# Patient Record
Sex: FEMALE | Race: WHITE | NOT HISPANIC OR LATINO | Employment: OTHER | URBAN - METROPOLITAN AREA
[De-identification: names, ages, dates, MRNs, and addresses within clinical notes are randomized per-mention and may not be internally consistent; named-entity substitution may affect disease eponyms.]

---

## 2019-11-15 RX ORDER — EZETIMIBE 10 MG/1
TABLET ORAL
Qty: 90 TABLET | Refills: 0 | Status: SHIPPED | OUTPATIENT
Start: 2019-11-15 | End: 2020-01-27

## 2019-12-18 RX ORDER — LOSARTAN POTASSIUM 25 MG/1
25 TABLET ORAL DAILY
Qty: 90 TABLET | Refills: 0 | Status: SHIPPED | OUTPATIENT
Start: 2019-12-18 | End: 2019-12-19 | Stop reason: SDUPTHER

## 2019-12-19 RX ORDER — LOSARTAN POTASSIUM 25 MG/1
25 TABLET ORAL DAILY
Qty: 90 TABLET | Refills: 0 | Status: SHIPPED | OUTPATIENT
Start: 2019-12-19 | End: 2020-03-16

## 2020-01-27 RX ORDER — EZETIMIBE 10 MG/1
TABLET ORAL
Qty: 90 TABLET | Refills: 0 | Status: SHIPPED | OUTPATIENT
Start: 2020-01-27 | End: 2020-04-09

## 2020-03-16 RX ORDER — LOSARTAN POTASSIUM 25 MG/1
25 TABLET ORAL DAILY
Qty: 90 TABLET | Refills: 0 | Status: SHIPPED | OUTPATIENT
Start: 2020-03-16 | End: 2020-06-09

## 2020-04-09 RX ORDER — EZETIMIBE 10 MG/1
TABLET ORAL
Qty: 90 TABLET | Refills: 0 | Status: SHIPPED | OUTPATIENT
Start: 2020-04-09 | End: 2020-08-27 | Stop reason: SDUPTHER

## 2020-06-09 RX ORDER — LOSARTAN POTASSIUM 25 MG/1
25 TABLET ORAL DAILY
Qty: 90 TABLET | Refills: 0 | Status: SHIPPED | OUTPATIENT
Start: 2020-06-09 | End: 2020-09-18 | Stop reason: SDUPTHER

## 2020-08-26 PROBLEM — R53.83 FATIGUE: Status: ACTIVE | Noted: 2017-04-27

## 2020-08-26 PROBLEM — I63.9 CEREBROVASCULAR ACCIDENT (CVA) (HCC): Status: ACTIVE | Noted: 2020-08-26

## 2020-08-26 PROBLEM — E78.5 HYPERLIPIDEMIA: Status: ACTIVE | Noted: 2020-08-26

## 2020-08-26 PROBLEM — I10 HYPERTENSION: Status: ACTIVE | Noted: 2020-08-26

## 2020-08-26 PROBLEM — I77.819 AORTIC ECTASIA (HCC): Status: ACTIVE | Noted: 2017-04-27

## 2020-08-27 ENCOUNTER — TELEPHONE (OUTPATIENT)
Dept: CARDIOLOGY | Facility: CLINIC | Age: 70
End: 2020-08-27

## 2020-08-27 RX ORDER — EZETIMIBE 10 MG/1
10 TABLET ORAL DAILY
Qty: 90 TABLET | Refills: 1 | Status: SHIPPED | OUTPATIENT
Start: 2020-08-27 | End: 2021-01-28

## 2020-09-18 ENCOUNTER — LAB (OUTPATIENT)
Dept: CARDIOLOGY | Facility: CLINIC | Age: 70
End: 2020-09-18

## 2020-09-18 ENCOUNTER — OFFICE VISIT (OUTPATIENT)
Dept: CARDIOLOGY | Facility: CLINIC | Age: 70
End: 2020-09-18

## 2020-09-18 VITALS
OXYGEN SATURATION: 95 % | SYSTOLIC BLOOD PRESSURE: 105 MMHG | DIASTOLIC BLOOD PRESSURE: 68 MMHG | HEART RATE: 58 BPM | BODY MASS INDEX: 30.32 KG/M2 | RESPIRATION RATE: 18 BRPM | HEIGHT: 65 IN | WEIGHT: 182 LBS

## 2020-09-18 DIAGNOSIS — I77.819 AORTIC ECTASIA (HCC): Primary | ICD-10-CM

## 2020-09-18 DIAGNOSIS — E78.5 DYSLIPIDEMIA: Primary | ICD-10-CM

## 2020-09-18 DIAGNOSIS — I10 ESSENTIAL HYPERTENSION: ICD-10-CM

## 2020-09-18 LAB
CHOLEST SERPL-MCNC: 196 MG/DL (ref 0–200)
HDLC SERPL QL: 2.8
HDLC SERPL-MCNC: 70 MG/DL (ref 40–60)
LDLC SERPL CALC-MCNC: 102 MG/DL (ref 0–100)
TRIGL SERPL-MCNC: 118 MG/DL (ref 0–150)

## 2020-09-18 PROCEDURE — 80061 LIPID PANEL: CPT | Performed by: INTERNAL MEDICINE

## 2020-09-18 PROCEDURE — 93000 ELECTROCARDIOGRAM COMPLETE: CPT | Performed by: INTERNAL MEDICINE

## 2020-09-18 PROCEDURE — 36416 COLLJ CAPILLARY BLOOD SPEC: CPT | Performed by: INTERNAL MEDICINE

## 2020-09-18 PROCEDURE — 99214 OFFICE O/P EST MOD 30 MIN: CPT | Performed by: INTERNAL MEDICINE

## 2020-09-18 RX ORDER — LOSARTAN POTASSIUM 25 MG/1
25 TABLET ORAL DAILY
Qty: 90 TABLET | Refills: 3 | Status: SHIPPED | OUTPATIENT
Start: 2020-09-18 | End: 2021-09-03

## 2020-09-18 NOTE — PROGRESS NOTES
"Cardiology Office Visit      Encounter Date:  09/18/2020    Patient ID:   Tori Brantley is a 70 y.o. female.    Reason For Followup:  Hypertension  Hyperlipidemia  Aortic ectasia    Brief Clinical History:  Dear Nicholas Marion MD    I had the pleasure of seeing Tori Brantley today. As you are well aware, this is a 70 y.o. female with no established history of ischemic heart disease.  She does have a history of hypertension, hyperlipidemia, aortic ectasia, and TIA.  She presents today for follow-up on the above conditions.    Interval History:  She denies any chest pain pressure heaviness or tightness.  She denies any shortness of breath.  She denies any PND orthopnea.  She denies any syncope or near syncope. She is reporting some occasional palpitations. She otherwise reports feeling well from a cardiac perspective.    She had a POC lipid profile performed and we reviewed the results. Her values have overall improved.    Assessment & Plan    Impressions:  Hypertension  Hyperlipidemia  Aortic ectasia  History of TIA    Recommendations:  Continuation of her current cardiovascular regimen at the present time.  CTA chest  Routine surveillance laboratory testing  Follow-up in 1 years time sooner should there be difficulties.    Objective:    Vitals:  Vitals:    09/18/20 1330   BP: 105/68   BP Location: Left arm   Patient Position: Sitting   Cuff Size: Large Adult   Pulse: 58   Resp: 18   SpO2: 95%   Weight: 82.6 kg (182 lb)   Height: 165.1 cm (65\")       Physical Exam:    General: Alert, cooperative, no distress, appears stated age  Head:  Normocephalic, atraumatic, mucous membranes moist  Eyes:  Conjunctiva/corneas clear, EOM's intact     Neck:  Supple,  no bruit  Lungs: Clear to auscultation bilaterally, no wheezes rhonchi rales are noted  Chest wall: No tenderness  Heart::  Regular rate and rhythm, S1 and S2 normal, 1/6 holosystolic murmur.  No rub or gallop  Abdomen: Soft, non-tender, nondistended " bowel sounds active  Extremities: No cyanosis, clubbing, or edema  Pulses: 2+ and symmetric all extremities  Skin:  No rashes or lesions  Neuro/psych: A&O x3. CN II through XII are grossly intact with appropriate affect      Allergies:  Allergies   Allergen Reactions   • Ibuprofen Hives   • Terbinafine Itching       Medication Review:     Current Outpatient Medications:   •  aspirin (Aspir-Low) 81 MG EC tablet, ASPIR-LOW 81 MG TBEC, Disp: , Rfl:   •  ezetimibe (ZETIA) 10 MG tablet, Take 1 tablet by mouth Daily., Disp: 90 tablet, Rfl: 1  •  levocetirizine (Xyzal Allergy 24HR) 5 MG tablet, Daily., Disp: , Rfl:   •  losartan (COZAAR) 25 MG tablet, TAKE 1 TABLET BY MOUTH DAILY, Disp: 90 tablet, Rfl: 0  •  omeprazole OTC (PrilOSEC OTC) 20 MG EC tablet, PRILOSEC OTC 20 MG TBEC, Disp: , Rfl:   •  Evolocumab (Repatha SureClick) 140 MG/ML solution auto-injector, REPATHA SURECLICK 140 MG/ML SOAJ, Disp: , Rfl:   •  metaxalone (Skelaxin) 800 MG tablet, SKELAXIN 800 MG TABS, Disp: , Rfl:     Family History:  Family History   Problem Relation Age of Onset   • Heart disease Mother    • Heart failure Father        Past Medical History:  Past Medical History:   Diagnosis Date   • Hyperlipidemia    • Hypertension    • Stroke (CMS/HCC)        Past surgical History:  Past Surgical History:   Procedure Laterality Date   • CATARACT EXTRACTION, BILATERAL     • EYE SURGERY     • HYSTERECTOMY     • LAPAROSCOPIC CHOLECYSTECTOMY         Social History:  Social History     Socioeconomic History   • Marital status:      Spouse name: Not on file   • Number of children: Not on file   • Years of education: Not on file   • Highest education level: Not on file   Tobacco Use   • Smoking status: Never Smoker   • Smokeless tobacco: Never Used   Substance and Sexual Activity   • Alcohol use: Not Currently   • Drug use: Never       Review of Systems:  The following systems were reviewed as they relate to the cardiovascular system: Constitutional,  Eyes, ENT, Cardiovascular, Respiratory, Gastrointestinal, Integumentary, Neurological, Psychiatric, Hematologic, Endocrine, Musculoskeletal, and Genitourinary. The pertinent cardiovascular findings are reported above with all other cardiovascular points within those systems being negative.    Diagnostic Study Review:     Current Electrocardiogram:    ECG 12 Lead    Date/Time: 9/18/2020 5:34 PM  Performed by: Ward Hankins DO  Authorized by: Ward Hankins DO   Comparison: not compared with previous ECG   Previous ECG: no previous ECG available  Comments: Normal sinus rhythm with a ventricular rate of 65 bpm.  Low voltage in the precordial leads.  Normal QT and QTc intervals.  Normal QRS axis              NOTE: The following portions of the patient's history were reviewed and updated this visit as appropriate: allergies, current medications, past family history, past medical history, past social history, past surgical history and problem list.

## 2020-09-22 ENCOUNTER — LAB (OUTPATIENT)
Dept: LAB | Facility: HOSPITAL | Age: 70
End: 2020-09-22

## 2020-09-22 DIAGNOSIS — I77.819 AORTIC ECTASIA (HCC): ICD-10-CM

## 2020-09-22 DIAGNOSIS — I10 ESSENTIAL HYPERTENSION: ICD-10-CM

## 2020-09-22 LAB
ALBUMIN SERPL-MCNC: 4.3 G/DL (ref 3.5–5.2)
ALBUMIN/GLOB SERPL: 1.4 G/DL
ALP SERPL-CCNC: 46 U/L (ref 39–117)
ALT SERPL W P-5'-P-CCNC: 28 U/L (ref 1–33)
ANION GAP SERPL CALCULATED.3IONS-SCNC: 11.3 MMOL/L (ref 5–15)
AST SERPL-CCNC: 32 U/L (ref 1–32)
BASOPHILS # BLD AUTO: 0.08 10*3/MM3 (ref 0–0.2)
BASOPHILS NFR BLD AUTO: 1.1 % (ref 0–1.5)
BILIRUB SERPL-MCNC: 0.4 MG/DL (ref 0–1.2)
BUN SERPL-MCNC: 10 MG/DL (ref 8–23)
BUN/CREAT SERPL: 13.7 (ref 7–25)
CALCIUM SPEC-SCNC: 9.8 MG/DL (ref 8.6–10.5)
CHLORIDE SERPL-SCNC: 104 MMOL/L (ref 98–107)
CO2 SERPL-SCNC: 22.7 MMOL/L (ref 22–29)
CREAT SERPL-MCNC: 0.73 MG/DL (ref 0.57–1)
DEPRECATED RDW RBC AUTO: 41.2 FL (ref 37–54)
EOSINOPHIL # BLD AUTO: 0.3 10*3/MM3 (ref 0–0.4)
EOSINOPHIL NFR BLD AUTO: 4 % (ref 0.3–6.2)
ERYTHROCYTE [DISTWIDTH] IN BLOOD BY AUTOMATED COUNT: 12.4 % (ref 12.3–15.4)
GFR SERPL CREATININE-BSD FRML MDRD: 79 ML/MIN/1.73
GLOBULIN UR ELPH-MCNC: 3 GM/DL
GLUCOSE SERPL-MCNC: 86 MG/DL (ref 65–99)
HCT VFR BLD AUTO: 39.9 % (ref 34–46.6)
HGB BLD-MCNC: 13.5 G/DL (ref 12–15.9)
IMM GRANULOCYTES # BLD AUTO: 0.02 10*3/MM3 (ref 0–0.05)
IMM GRANULOCYTES NFR BLD AUTO: 0.3 % (ref 0–0.5)
LYMPHOCYTES # BLD AUTO: 2.82 10*3/MM3 (ref 0.7–3.1)
LYMPHOCYTES NFR BLD AUTO: 37.4 % (ref 19.6–45.3)
MCH RBC QN AUTO: 30.8 PG (ref 26.6–33)
MCHC RBC AUTO-ENTMCNC: 33.8 G/DL (ref 31.5–35.7)
MCV RBC AUTO: 91.1 FL (ref 79–97)
MONOCYTES # BLD AUTO: 0.53 10*3/MM3 (ref 0.1–0.9)
MONOCYTES NFR BLD AUTO: 7 % (ref 5–12)
NEUTROPHILS NFR BLD AUTO: 3.79 10*3/MM3 (ref 1.7–7)
NEUTROPHILS NFR BLD AUTO: 50.2 % (ref 42.7–76)
NRBC BLD AUTO-RTO: 0.1 /100 WBC (ref 0–0.2)
PLATELET # BLD AUTO: 278 10*3/MM3 (ref 140–450)
PMV BLD AUTO: 11.8 FL (ref 6–12)
POTASSIUM SERPL-SCNC: 4.4 MMOL/L (ref 3.5–5.2)
PROT SERPL-MCNC: 7.3 G/DL (ref 6–8.5)
RBC # BLD AUTO: 4.38 10*6/MM3 (ref 3.77–5.28)
SODIUM SERPL-SCNC: 138 MMOL/L (ref 136–145)
WBC # BLD AUTO: 7.54 10*3/MM3 (ref 3.4–10.8)

## 2020-09-22 PROCEDURE — 85025 COMPLETE CBC W/AUTO DIFF WBC: CPT

## 2020-09-22 PROCEDURE — 36415 COLL VENOUS BLD VENIPUNCTURE: CPT

## 2020-09-22 PROCEDURE — 80053 COMPREHEN METABOLIC PANEL: CPT

## 2020-10-28 ENCOUNTER — HOSPITAL ENCOUNTER (OUTPATIENT)
Dept: CT IMAGING | Facility: HOSPITAL | Age: 70
Discharge: HOME | End: 2020-10-28
Admitting: INTERNAL MEDICINE

## 2020-10-28 DIAGNOSIS — I77.819 AORTIC ECTASIA (HCC): ICD-10-CM

## 2020-10-28 DIAGNOSIS — I10 ESSENTIAL HYPERTENSION: ICD-10-CM

## 2020-10-28 LAB — CREAT BLDA-MCNC: 0.7 MG/DL (ref 0.6–1.3)

## 2020-10-28 PROCEDURE — 0 IOPAMIDOL PER 1 ML: Performed by: INTERNAL MEDICINE

## 2020-10-28 PROCEDURE — 71275 CT ANGIOGRAPHY CHEST: CPT

## 2020-10-28 PROCEDURE — 82565 ASSAY OF CREATININE: CPT

## 2020-10-28 RX ADMIN — IOPAMIDOL 100 ML: 755 INJECTION, SOLUTION INTRAVENOUS at 13:45

## 2020-11-05 ENCOUNTER — TELEPHONE (OUTPATIENT)
Dept: CARDIOLOGY | Facility: CLINIC | Age: 70
End: 2020-11-05

## 2020-11-05 NOTE — TELEPHONE ENCOUNTER
Called and Notified patient per Dr Hankins of CT results.  She verbalized understanding.      ______Message_____  Ward Hankins DO Trent, Melissa, MA             You can give her the results.  Her scan was normal with no signs of aneurysm or ectasia.        Previous Messages      ----- Message -----   From: Sandra Pike MA   Sent: 11/4/2020  12:58 PM EST   To: Ward Hankins DO   Subject: CT scan                                           Patient would like the results of her CT Angiogram Chest scan.   It is in the chart.

## 2021-01-28 RX ORDER — EZETIMIBE 10 MG/1
TABLET ORAL
Qty: 90 TABLET | Refills: 1 | Status: SHIPPED | OUTPATIENT
Start: 2021-01-28 | End: 2021-07-12

## 2021-07-12 RX ORDER — EZETIMIBE 10 MG/1
TABLET ORAL
Qty: 90 TABLET | Refills: 0 | Status: SHIPPED | OUTPATIENT
Start: 2021-07-12 | End: 2021-11-29

## 2021-09-03 RX ORDER — LOSARTAN POTASSIUM 25 MG/1
25 TABLET ORAL DAILY
Qty: 30 TABLET | Refills: 0 | Status: SHIPPED | OUTPATIENT
Start: 2021-09-03 | End: 2021-09-07

## 2021-09-07 RX ORDER — LOSARTAN POTASSIUM 25 MG/1
25 TABLET ORAL DAILY
Qty: 90 TABLET | Refills: 0 | Status: SHIPPED | OUTPATIENT
Start: 2021-09-07 | End: 2021-12-01

## 2021-11-29 RX ORDER — EZETIMIBE 10 MG/1
TABLET ORAL
Qty: 90 TABLET | Refills: 1 | Status: SHIPPED | OUTPATIENT
Start: 2021-11-29 | End: 2022-04-19

## 2021-12-01 DIAGNOSIS — I95.1 ORTHOSTATIC HYPOTENSION: Primary | ICD-10-CM

## 2021-12-01 RX ORDER — LOSARTAN POTASSIUM 25 MG/1
25 TABLET ORAL DAILY
Qty: 90 TABLET | Refills: 3 | Status: SHIPPED | OUTPATIENT
Start: 2021-12-01

## 2021-12-06 NOTE — PROGRESS NOTES
"Cardiology Office Visit      Encounter Date:  12/07/2021    Patient ID:   Tori Brantley is a 71 y.o. female.    Reason For Followup:  Hypertension  Hyperlipidemia  Aortic ectasia    Brief Clinical History:  Dear Nicholas Marion MD    I had the pleasure of seeing Tori Brantley today. As you are well aware, this is a 71 y.o. female with no established history of ischemic heart disease.  She does have a history of hypertension, hyperlipidemia, aortic ectasia, and TIA.  She presents today for follow-up on the above conditions.    Interval History:  She denies any chest pain pressure heaviness or tightness.  She denies any shortness of breath.  She denies any PND orthopnea.  She denies any syncope or near syncope. She is reporting some occasional palpitations. She otherwise reports feeling well from a cardiac perspective.    We did review her point-of-care lipid profile today.  Her results have deteriorated.  She reports that she has been sleeping in her diet.  She will work on this.    She had a CT angiography of her chest performed in October 2020.  She has a reported history of aortic ectasia however the CT scan failed to demonstrate any significant enlargement of the aorta.  In fact no evidence of ectasia or aneurysm was noted.  We reviewed these results today.    Assessment & Plan    Impressions:  Hypertension  Hyperlipidemia  History of aortic ectasia     CTA October 2020 with no evidence of ectasia or aneurysm  History of TIA    Recommendations:  Continuation of her current cardiovascular regimen at the present time.     This includes Zetia, antihypertensives, and antiplatelets.  Routine surveillance laboratory testing  Diet and exercise  Follow-up as needed    Objective:    Vitals:  Vitals:    12/07/21 0905   BP: 116/82   BP Location: Left arm   Patient Position: Sitting   Cuff Size: Large Adult   Pulse: 67   Resp: 18   SpO2: 97%   Weight: 81.2 kg (179 lb)   Height: 167.6 cm (66\")       Physical " Exam:    General: Alert, cooperative, no distress, appears stated age  Head:  Normocephalic, atraumatic, mucous membranes moist  Eyes:  Conjunctiva/corneas clear, EOM's intact     Neck:  Supple,  no bruit  Lungs: Clear to auscultation bilaterally, no wheezes rhonchi rales are noted  Chest wall: No tenderness  Heart::  Regular rate and rhythm, S1 and S2 normal, 1/6 holosystolic murmur.  No rub or gallop  Abdomen: Soft, non-tender, nondistended bowel sounds active  Extremities: No cyanosis, clubbing, or edema  Pulses: 2+ and symmetric all extremities  Skin:  No rashes or lesions  Neuro/psych: A&O x3. CN II through XII are grossly intact with appropriate affect      Allergies:  Allergies   Allergen Reactions   • Ibuprofen Hives   • Terbinafine Itching   • Latex Hives   • Nickel Dermatitis, Itching and Swelling       Medication Review:     Current Outpatient Medications:   •  albuterol (PROVENTIL) (2.5 MG/3ML) 0.083% nebulizer solution, MIX 1 VIAL IN NEBULIZER EVERY 6 HOURS AS NEEDED FOR WHEEZING, Disp: , Rfl:   •  albuterol sulfate  (90 Base) MCG/ACT inhaler, INHALE 1 PUFF INTO LUNGS FOUR TIMES DAILY AS NEEDED, Disp: , Rfl:   •  aspirin (Aspir-Low) 81 MG EC tablet, ASPIR-LOW 81 MG TBEC, Disp: , Rfl:   •  ezetimibe (ZETIA) 10 MG tablet, TAKE 1 TABLET DAILY, Disp: 90 tablet, Rfl: 1  •  levocetirizine (Xyzal Allergy 24HR) 5 MG tablet, Daily., Disp: , Rfl:   •  losartan (COZAAR) 25 MG tablet, TAKE 1 TABLET BY MOUTH DAILY, Disp: 90 tablet, Rfl: 3  •  meclizine (ANTIVERT) 25 MG tablet, Take 25 mg by mouth 3 (Three) Times a Day As Needed., Disp: , Rfl:   •  omeprazole OTC (PrilOSEC OTC) 20 MG EC tablet, PRILOSEC OTC 20 MG TBEC, Disp: , Rfl:     Family History:  Family History   Problem Relation Age of Onset   • Heart disease Mother    • Heart failure Father        Past Medical History:  Past Medical History:   Diagnosis Date   • Hyperlipidemia    • Hypertension    • Stroke (HCC)        Past surgical History:  Past  Surgical History:   Procedure Laterality Date   • CATARACT EXTRACTION, BILATERAL     • EYE SURGERY     • HYSTERECTOMY     • LAPAROSCOPIC CHOLECYSTECTOMY         Social History:  Social History     Socioeconomic History   • Marital status:    Tobacco Use   • Smoking status: Never Smoker   • Smokeless tobacco: Never Used   Vaping Use   • Vaping Use: Never used   Substance and Sexual Activity   • Alcohol use: Not Currently   • Drug use: Never       Review of Systems:  The following systems were reviewed as they relate to the cardiovascular system: Constitutional, Eyes, ENT, Cardiovascular, Respiratory, Gastrointestinal, Integumentary, Neurological, Psychiatric, Hematologic, Endocrine, Musculoskeletal, and Genitourinary. The pertinent cardiovascular findings are reported above with all other cardiovascular points within those systems being negative.    Diagnostic Study Review:     Current Electrocardiogram:    ECG 12 Lead    Date/Time: 12/7/2021 7:38 PM  Performed by: Ward Hankins DO  Authorized by: Ward Hankins DO   Comparison: not compared with previous ECG   Previous ECG: no previous ECG available  Comments: Normal sinus rhythm with a ventricular to 67 bpm.  Low voltage in the precordial leads.  Normal QT and QTc intervals.  Normal QRS axis.              NOTE: The following portions of the patient's note were reviewed, confirmed and/or updated this visit as appropriate: History of present illness/Interval history, physical examination, assessment & plan, allergies, current medications, past family history, past medical history, past social history, past surgical history and problem list.

## 2021-12-07 ENCOUNTER — OFFICE VISIT (OUTPATIENT)
Dept: CARDIOLOGY | Facility: CLINIC | Age: 71
End: 2021-12-07

## 2021-12-07 ENCOUNTER — LAB (OUTPATIENT)
Dept: CARDIOLOGY | Facility: CLINIC | Age: 71
End: 2021-12-07

## 2021-12-07 VITALS
SYSTOLIC BLOOD PRESSURE: 116 MMHG | WEIGHT: 179 LBS | OXYGEN SATURATION: 97 % | HEART RATE: 67 BPM | BODY MASS INDEX: 28.77 KG/M2 | HEIGHT: 66 IN | DIASTOLIC BLOOD PRESSURE: 82 MMHG | RESPIRATION RATE: 18 BRPM

## 2021-12-07 DIAGNOSIS — I77.819 AORTIC ECTASIA (HCC): Primary | ICD-10-CM

## 2021-12-07 DIAGNOSIS — E78.5 DYSLIPIDEMIA: Primary | ICD-10-CM

## 2021-12-07 DIAGNOSIS — E78.2 MIXED HYPERLIPIDEMIA: ICD-10-CM

## 2021-12-07 DIAGNOSIS — I10 PRIMARY HYPERTENSION: ICD-10-CM

## 2021-12-07 DIAGNOSIS — Z79.02 LONG TERM CURRENT USE OF ANTITHROMBOTICS/ANTIPLATELETS: ICD-10-CM

## 2021-12-07 LAB
CHOLEST SERPL-MCNC: 247 MG/DL (ref 0–200)
HDLC SERPL QL: 3.3
HDLC SERPL-MCNC: 75 MG/DL (ref 40–60)
LDLC SERPL CALC-MCNC: 124 MG/DL (ref 0–100)
TRIGL SERPL-MCNC: 239 MG/DL (ref 0–150)

## 2021-12-07 PROCEDURE — 80061 LIPID PANEL: CPT | Performed by: INTERNAL MEDICINE

## 2021-12-07 PROCEDURE — 93000 ELECTROCARDIOGRAM COMPLETE: CPT | Performed by: INTERNAL MEDICINE

## 2021-12-07 PROCEDURE — 99213 OFFICE O/P EST LOW 20 MIN: CPT | Performed by: INTERNAL MEDICINE

## 2021-12-07 RX ORDER — ALBUTEROL SULFATE 2.5 MG/3ML
SOLUTION RESPIRATORY (INHALATION)
COMMUNITY
Start: 2021-11-13

## 2021-12-07 RX ORDER — MECLIZINE HYDROCHLORIDE 25 MG/1
25 TABLET ORAL 3 TIMES DAILY PRN
COMMUNITY
Start: 2021-11-04

## 2021-12-07 RX ORDER — ALBUTEROL SULFATE 90 UG/1
AEROSOL, METERED RESPIRATORY (INHALATION)
COMMUNITY
Start: 2021-10-30

## 2022-04-19 RX ORDER — EZETIMIBE 10 MG/1
TABLET ORAL
Qty: 90 TABLET | Refills: 1 | Status: SHIPPED | OUTPATIENT
Start: 2022-04-19 | End: 2022-09-12

## 2022-09-12 RX ORDER — EZETIMIBE 10 MG/1
TABLET ORAL
Qty: 90 TABLET | Refills: 1 | Status: SHIPPED | OUTPATIENT
Start: 2022-09-12 | End: 2023-03-02

## 2023-03-02 RX ORDER — EZETIMIBE 10 MG/1
TABLET ORAL
Qty: 90 TABLET | Refills: 1 | Status: SHIPPED | OUTPATIENT
Start: 2023-03-02

## 2023-05-03 ENCOUNTER — OFFICE (AMBULATORY)
Dept: URBAN - METROPOLITAN AREA CLINIC 64 | Facility: CLINIC | Age: 73
End: 2023-05-03

## 2023-05-03 VITALS
WEIGHT: 188 LBS | SYSTOLIC BLOOD PRESSURE: 109 MMHG | DIASTOLIC BLOOD PRESSURE: 58 MMHG | HEIGHT: 65 IN | HEART RATE: 75 BPM

## 2023-05-03 DIAGNOSIS — R10.84 GENERALIZED ABDOMINAL PAIN: ICD-10-CM

## 2023-05-03 DIAGNOSIS — R11.0 NAUSEA: ICD-10-CM

## 2023-05-03 DIAGNOSIS — K21.9 GASTRO-ESOPHAGEAL REFLUX DISEASE WITHOUT ESOPHAGITIS: ICD-10-CM

## 2023-05-03 DIAGNOSIS — K59.00 CONSTIPATION, UNSPECIFIED: ICD-10-CM

## 2023-05-03 PROCEDURE — 99203 OFFICE O/P NEW LOW 30 MIN: CPT

## 2023-10-14 ENCOUNTER — HOSPITAL ENCOUNTER (OUTPATIENT)
Facility: HOSPITAL | Age: 73
Setting detail: OBSERVATION
Discharge: HOME | End: 2023-10-15
Attending: EMERGENCY MEDICINE | Admitting: EMERGENCY MEDICINE
Payer: MEDICARE

## 2023-10-14 ENCOUNTER — APPOINTMENT (OUTPATIENT)
Dept: GENERAL RADIOLOGY | Facility: HOSPITAL | Age: 73
End: 2023-10-14
Payer: MEDICARE

## 2023-10-14 DIAGNOSIS — R07.9 CHEST PAIN, UNSPECIFIED TYPE: Primary | ICD-10-CM

## 2023-10-14 DIAGNOSIS — M25.512 ACUTE PAIN OF LEFT SHOULDER: ICD-10-CM

## 2023-10-14 LAB
ALBUMIN SERPL-MCNC: 4.2 G/DL (ref 3.5–5.2)
ALBUMIN/GLOB SERPL: 1.3 G/DL
ALP SERPL-CCNC: 47 U/L (ref 39–117)
ALT SERPL W P-5'-P-CCNC: 18 U/L (ref 1–33)
ANION GAP SERPL CALCULATED.3IONS-SCNC: 13 MMOL/L (ref 5–15)
AST SERPL-CCNC: 26 U/L (ref 1–32)
BASOPHILS # BLD AUTO: 0.1 10*3/MM3 (ref 0–0.2)
BASOPHILS NFR BLD AUTO: 0.8 % (ref 0–1.5)
BILIRUB SERPL-MCNC: 0.5 MG/DL (ref 0–1.2)
BUN SERPL-MCNC: 14 MG/DL (ref 8–23)
BUN/CREAT SERPL: 17.1 (ref 7–25)
CALCIUM SPEC-SCNC: 10 MG/DL (ref 8.6–10.5)
CHLORIDE SERPL-SCNC: 102 MMOL/L (ref 98–107)
CHOLEST SERPL-MCNC: 214 MG/DL (ref 0–200)
CO2 SERPL-SCNC: 23 MMOL/L (ref 22–29)
CREAT SERPL-MCNC: 0.82 MG/DL (ref 0.57–1)
D DIMER PPP FEU-MCNC: 0.61 MG/L (FEU) (ref 0–0.73)
DEPRECATED RDW RBC AUTO: 47.3 FL (ref 37–54)
EGFRCR SERPLBLD CKD-EPI 2021: 75.6 ML/MIN/1.73
EOSINOPHIL # BLD AUTO: 0.5 10*3/MM3 (ref 0–0.4)
EOSINOPHIL NFR BLD AUTO: 5.5 % (ref 0.3–6.2)
ERYTHROCYTE [DISTWIDTH] IN BLOOD BY AUTOMATED COUNT: 13.9 % (ref 12.3–15.4)
GLOBULIN UR ELPH-MCNC: 3.3 GM/DL
GLUCOSE SERPL-MCNC: 93 MG/DL (ref 65–99)
HBA1C MFR BLD: 5.7 % (ref 4.8–5.6)
HCT VFR BLD AUTO: 41.3 % (ref 34–46.6)
HDLC SERPL-MCNC: 78 MG/DL (ref 40–60)
HGB BLD-MCNC: 13.6 G/DL (ref 12–15.9)
HOLD SPECIMEN: NORMAL
LDLC SERPL CALC-MCNC: 109 MG/DL (ref 0–100)
LDLC/HDLC SERPL: 1.34 {RATIO}
LIPASE SERPL-CCNC: 41 U/L (ref 13–60)
LYMPHOCYTES # BLD AUTO: 3.9 10*3/MM3 (ref 0.7–3.1)
LYMPHOCYTES NFR BLD AUTO: 40.6 % (ref 19.6–45.3)
MAGNESIUM SERPL-MCNC: 1.9 MG/DL (ref 1.6–2.4)
MCH RBC QN AUTO: 30.2 PG (ref 26.6–33)
MCHC RBC AUTO-ENTMCNC: 32.9 G/DL (ref 31.5–35.7)
MCV RBC AUTO: 91.7 FL (ref 79–97)
MONOCYTES # BLD AUTO: 0.6 10*3/MM3 (ref 0.1–0.9)
MONOCYTES NFR BLD AUTO: 6.4 % (ref 5–12)
NEUTROPHILS NFR BLD AUTO: 4.4 10*3/MM3 (ref 1.7–7)
NEUTROPHILS NFR BLD AUTO: 46.7 % (ref 42.7–76)
NRBC BLD AUTO-RTO: 0.2 /100 WBC (ref 0–0.2)
NT-PROBNP SERPL-MCNC: 102.4 PG/ML (ref 0–900)
PLATELET # BLD AUTO: 277 10*3/MM3 (ref 140–450)
PMV BLD AUTO: 9.6 FL (ref 6–12)
POTASSIUM SERPL-SCNC: 4.2 MMOL/L (ref 3.5–5.2)
PROT SERPL-MCNC: 7.5 G/DL (ref 6–8.5)
RBC # BLD AUTO: 4.5 10*6/MM3 (ref 3.77–5.28)
SODIUM SERPL-SCNC: 138 MMOL/L (ref 136–145)
T4 FREE SERPL-MCNC: 1.27 NG/DL (ref 0.93–1.7)
TRIGL SERPL-MCNC: 159 MG/DL (ref 0–150)
TROPONIN T SERPL HS-MCNC: 6 NG/L
TROPONIN T SERPL HS-MCNC: <6 NG/L
TSH SERPL DL<=0.05 MIU/L-ACNC: 2.93 UIU/ML (ref 0.27–4.2)
VLDLC SERPL-MCNC: 27 MG/DL (ref 5–40)
WBC NRBC COR # BLD: 9.5 10*3/MM3 (ref 3.4–10.8)
WHOLE BLOOD HOLD COAG: NORMAL
WHOLE BLOOD HOLD SPECIMEN: NORMAL

## 2023-10-14 PROCEDURE — 93005 ELECTROCARDIOGRAM TRACING: CPT

## 2023-10-14 PROCEDURE — 93005 ELECTROCARDIOGRAM TRACING: CPT | Performed by: EMERGENCY MEDICINE

## 2023-10-14 PROCEDURE — 85379 FIBRIN DEGRADATION QUANT: CPT | Performed by: NURSE PRACTITIONER

## 2023-10-14 PROCEDURE — 36415 COLL VENOUS BLD VENIPUNCTURE: CPT

## 2023-10-14 PROCEDURE — 84439 ASSAY OF FREE THYROXINE: CPT | Performed by: NURSE PRACTITIONER

## 2023-10-14 PROCEDURE — 83735 ASSAY OF MAGNESIUM: CPT | Performed by: NURSE PRACTITIONER

## 2023-10-14 PROCEDURE — 84443 ASSAY THYROID STIM HORMONE: CPT | Performed by: NURSE PRACTITIONER

## 2023-10-14 PROCEDURE — G0378 HOSPITAL OBSERVATION PER HR: HCPCS

## 2023-10-14 PROCEDURE — 83690 ASSAY OF LIPASE: CPT | Performed by: NURSE PRACTITIONER

## 2023-10-14 PROCEDURE — 80053 COMPREHEN METABOLIC PANEL: CPT | Performed by: NURSE PRACTITIONER

## 2023-10-14 PROCEDURE — 71045 X-RAY EXAM CHEST 1 VIEW: CPT

## 2023-10-14 PROCEDURE — 85025 COMPLETE CBC W/AUTO DIFF WBC: CPT | Performed by: NURSE PRACTITIONER

## 2023-10-14 PROCEDURE — 83036 HEMOGLOBIN GLYCOSYLATED A1C: CPT | Performed by: NURSE PRACTITIONER

## 2023-10-14 PROCEDURE — 83880 ASSAY OF NATRIURETIC PEPTIDE: CPT | Performed by: NURSE PRACTITIONER

## 2023-10-14 PROCEDURE — 84484 ASSAY OF TROPONIN QUANT: CPT | Performed by: NURSE PRACTITIONER

## 2023-10-14 PROCEDURE — 80061 LIPID PANEL: CPT | Performed by: NURSE PRACTITIONER

## 2023-10-14 PROCEDURE — 99284 EMERGENCY DEPT VISIT MOD MDM: CPT

## 2023-10-14 RX ORDER — LOSARTAN POTASSIUM 25 MG/1
25 TABLET ORAL DAILY
Status: DISCONTINUED | OUTPATIENT
Start: 2023-10-14 | End: 2023-10-15 | Stop reason: HOSPADM

## 2023-10-14 RX ORDER — BISACODYL 10 MG
10 SUPPOSITORY, RECTAL RECTAL DAILY PRN
Status: DISCONTINUED | OUTPATIENT
Start: 2023-10-14 | End: 2023-10-15 | Stop reason: HOSPADM

## 2023-10-14 RX ORDER — SODIUM CHLORIDE 9 MG/ML
40 INJECTION, SOLUTION INTRAVENOUS AS NEEDED
Status: DISCONTINUED | OUTPATIENT
Start: 2023-10-14 | End: 2023-10-15 | Stop reason: HOSPADM

## 2023-10-14 RX ORDER — POLYETHYLENE GLYCOL 3350 17 G/17G
17 POWDER, FOR SOLUTION ORAL DAILY PRN
Status: DISCONTINUED | OUTPATIENT
Start: 2023-10-14 | End: 2023-10-15 | Stop reason: HOSPADM

## 2023-10-14 RX ORDER — ONDANSETRON 2 MG/ML
4 INJECTION INTRAMUSCULAR; INTRAVENOUS EVERY 6 HOURS PRN
Status: DISCONTINUED | OUTPATIENT
Start: 2023-10-14 | End: 2023-10-15 | Stop reason: HOSPADM

## 2023-10-14 RX ORDER — ALBUTEROL SULFATE 2.5 MG/3ML
2.5 SOLUTION RESPIRATORY (INHALATION) EVERY 6 HOURS PRN
Status: DISCONTINUED | OUTPATIENT
Start: 2023-10-14 | End: 2023-10-15 | Stop reason: HOSPADM

## 2023-10-14 RX ORDER — SODIUM CHLORIDE 0.9 % (FLUSH) 0.9 %
10 SYRINGE (ML) INJECTION AS NEEDED
Status: DISCONTINUED | OUTPATIENT
Start: 2023-10-14 | End: 2023-10-15 | Stop reason: HOSPADM

## 2023-10-14 RX ORDER — ACETAMINOPHEN 325 MG/1
650 TABLET ORAL EVERY 4 HOURS PRN
Status: DISCONTINUED | OUTPATIENT
Start: 2023-10-14 | End: 2023-10-15 | Stop reason: HOSPADM

## 2023-10-14 RX ORDER — ONDANSETRON 4 MG/1
4 TABLET, FILM COATED ORAL EVERY 6 HOURS PRN
Status: DISCONTINUED | OUTPATIENT
Start: 2023-10-14 | End: 2023-10-15 | Stop reason: HOSPADM

## 2023-10-14 RX ORDER — ALUMINA, MAGNESIA, AND SIMETHICONE 2400; 2400; 240 MG/30ML; MG/30ML; MG/30ML
15 SUSPENSION ORAL EVERY 6 HOURS PRN
Status: DISCONTINUED | OUTPATIENT
Start: 2023-10-14 | End: 2023-10-15 | Stop reason: HOSPADM

## 2023-10-14 RX ORDER — ASPIRIN 81 MG/1
81 TABLET ORAL EVERY EVENING
Status: DISCONTINUED | OUTPATIENT
Start: 2023-10-14 | End: 2023-10-15 | Stop reason: HOSPADM

## 2023-10-14 RX ORDER — AMOXICILLIN 250 MG
2 CAPSULE ORAL 2 TIMES DAILY
Status: DISCONTINUED | OUTPATIENT
Start: 2023-10-14 | End: 2023-10-15 | Stop reason: HOSPADM

## 2023-10-14 RX ORDER — CETIRIZINE HYDROCHLORIDE 10 MG/1
10 TABLET ORAL DAILY
Status: DISCONTINUED | OUTPATIENT
Start: 2023-10-14 | End: 2023-10-15 | Stop reason: HOSPADM

## 2023-10-14 RX ORDER — SODIUM CHLORIDE 0.9 % (FLUSH) 0.9 %
10 SYRINGE (ML) INJECTION EVERY 12 HOURS SCHEDULED
Status: DISCONTINUED | OUTPATIENT
Start: 2023-10-14 | End: 2023-10-15 | Stop reason: HOSPADM

## 2023-10-14 RX ORDER — BISACODYL 5 MG/1
5 TABLET, DELAYED RELEASE ORAL DAILY PRN
Status: DISCONTINUED | OUTPATIENT
Start: 2023-10-14 | End: 2023-10-15 | Stop reason: HOSPADM

## 2023-10-14 RX ORDER — PANTOPRAZOLE SODIUM 40 MG/1
40 TABLET, DELAYED RELEASE ORAL
Status: DISCONTINUED | OUTPATIENT
Start: 2023-10-15 | End: 2023-10-15 | Stop reason: HOSPADM

## 2023-10-14 RX ORDER — NITROGLYCERIN 0.4 MG/1
0.4 TABLET SUBLINGUAL
Status: DISCONTINUED | OUTPATIENT
Start: 2023-10-14 | End: 2023-10-15 | Stop reason: HOSPADM

## 2023-10-14 RX ADMIN — CETIRIZINE HYDROCHLORIDE 10 MG: 10 TABLET, FILM COATED ORAL at 17:17

## 2023-10-14 RX ADMIN — ASPIRIN 81 MG: 81 TABLET, COATED ORAL at 17:16

## 2023-10-14 RX ADMIN — LOSARTAN POTASSIUM 25 MG: 25 TABLET, FILM COATED ORAL at 17:17

## 2023-10-14 RX ADMIN — Medication 10 ML: at 21:00

## 2023-10-14 RX ADMIN — NITROGLYCERIN 0.4 MG: 0.4 TABLET SUBLINGUAL at 13:08

## 2023-10-14 NOTE — PLAN OF CARE
Goal Outcome Evaluation:  Plan of Care Reviewed With: patient, spouse        Progress: no change  Outcome Evaluation: Pt has been oriented to the unit. Call light within reach. No c/o chest pain at this time will continue to monitor.

## 2023-10-14 NOTE — ED PROVIDER NOTES
Subjective   History of Present Illness  Chief complaint: Chest pain       Context:  patient is 73-year-old female who presents amatory with complaints of substernal nonradiating Chest pain that woke her up around 5:00 this morning, she states it is worse with taking a deep breath.  She denies any associated diaphoresis or nausea.  She denies any exacerbation with exertion.  No swelling to her legs or feet, she does have a history of reflux but states that has been at baseline.  She denies any nausea vomiting diarrhea melena hematochezia urinary complaints cough congestion fever.  She reports a family history of cardiac disease.  Was following with Dr. Hankins, last stress 2019, never had a heart cath. No hx smoking/etoh/drug use/vte/thyroid disease.         PCP:   brii        Review of Systems   Constitutional:  Negative for fever.       Past Medical History:   Diagnosis Date    Hyperlipidemia     Hypertension     Stroke        Allergies   Allergen Reactions    Ibuprofen Hives    Terbinafine Itching    Dilantin [Phenytoin] Swelling    Latex Hives    Nickel Dermatitis, Itching and Swelling    Propofol Unknown - Low Severity    Bactrim [Sulfamethoxazole-Trimethoprim] Rash    Codeine Rash    Doxycycline Rash    Erythromycin Rash    Iodine Rash    Misc. Sulfonamide Containing Compounds Rash    Norco [Hydrocodone-Acetaminophen] Rash    Zocor [Simvastatin] Rash       Past Surgical History:   Procedure Laterality Date    CATARACT EXTRACTION, BILATERAL      EYE SURGERY      HYSTERECTOMY      LAPAROSCOPIC CHOLECYSTECTOMY         Family History   Problem Relation Age of Onset    Heart disease Mother     Heart failure Father        Social History     Socioeconomic History    Marital status:    Tobacco Use    Smoking status: Never    Smokeless tobacco: Never   Vaping Use    Vaping Use: Never used   Substance and Sexual Activity    Alcohol use: Not Currently    Drug use: Never           Objective   Physical  Exam    Vital signs and triage nurse note reviewed.  Constitutional: Awake, alert; well-developed and well-nourished. No acute distress is noted.  HEENT: Normocephalic, atraumatic; pupils are PERRL with intact EOM; oropharynx is pink and moist without exudate or erythema.  Neck: Supple, full range of motion without pain; no JVD  Cardiovascular: Regular rate and rhythm, normal S1-S2.  Faint murmur  Pulmonary: Respiratory effort regular nonlabored, breath sounds clear to auscultation all fields.  Abdomen: Soft, nontender nondistended with normoactive bowel sounds; no rebound or guarding.  Musculoskeletal: Independent range of motion of all extremities with no palpable tenderness or edema.  Neuro: Alert oriented x3, speech is clear and appropriate, GCS 15  Skin:  Fleshtone warm, dry, intact; no erythematous or petechial rash or lesion      Procedures           ED Course  ED Course as of 10/14/23 1500   Sat Oct 14, 2023   1417 Spoke with Christy for admission [JW]      ED Course User Index  [JW] Mariposa Allen APRN      Labs Reviewed   CBC WITH AUTO DIFFERENTIAL - Abnormal; Notable for the following components:       Result Value    Lymphocytes, Absolute 3.90 (*)     Eosinophils, Absolute 0.50 (*)     All other components within normal limits   LIPASE - Normal   BNP (IN-HOUSE) - Normal    Narrative:     This assay is used as an aid in the diagnosis of individuals suspected of having heart failure. It can be used as an aid in the diagnosis of acute decompensated heart failure (ADHF) in patients presenting with signs and symptoms of ADHF to the emergency department (ED). In addition, NT-proBNP of <300 pg/mL indicates ADHF is not likely.    Age Range Result Interpretation  NT-proBNP Concentration (pg/mL:      <50             Positive            >450                   Gray                 300-450                    Negative             <300    50-75           Positive            >900                  Mendoza                 "300-900                  Negative            <300      >75             Positive            >1800                  Gray                300-1800                  Negative            <300   SINGLE HSTROPONIN T - Normal    Narrative:     High Sensitive Troponin T Reference Range:  <10.0 ng/L- Negative Female for AMI  <15.0 ng/L- Negative Male for AMI  >=10 - Abnormal Female indicating possible myocardial injury.  >=15 - Abnormal Male indicating possible myocardial injury.   Clinicians would have to utilize clinical acumen, EKG, Troponin, and serial changes to determine if it is an Acute Myocardial Infarction or myocardial injury due to an underlying chronic condition.        TSH - Normal   MAGNESIUM - Normal   D-DIMER, QUANTITATIVE - Normal    Narrative:     According to the assay 's published package insert, a normal (<0.50 mg/L (FEU)) D-dimer result in conjunction with a non-high clinical probability assessment, excludes deep vein thrombosis (DVT) and pulmonary embolism (PE) with high sensitivity.    D-dimer values increase with age and this can make VTE exclusion of an older population difficult. To address this, the [employer], based on best available evidence and recent guidelines, recommends that clinicians use age-adjusted D-dimer thresholds in patients greater than 50 years of age with: a) a low probability of PE who do not meet all Pulmonary Embolism Rule Out Criteria, or b) in those with intermediate probability of PE.   The formula for an age-adjusted D-dimer cut-off is \"age/100\".  For example, a 60 year old patient would have an age-adjusted cut-off of 0.60 mg/L (FEU) and an 80 year old 0.80 mg/L (FEU).   RAINBOW DRAW    Narrative:     The following orders were created for panel order [facility] Draw.  Procedure                               Abnormality         Status                     ---------                               -----------         ------                     Green " Top (Gel)[[id number]]                                                             Lavender Top[[id number]]                                     Final result               Gold Top - SST[[id number]]                                   Final result               Light Blue Top[[id number]]                                   Final result                 Please view results for these tests on the individual orders.   COMPREHENSIVE METABOLIC PANEL    Narrative:     GFR Normal >60  Chronic Kidney Disease <60  Kidney Failure <15    The GFR formula is only valid for adults with stable renal function between ages 18 and 70.   LAVENDER TOP   GOLD TOP - SST   LIGHT BLUE TOP   CBC AND DIFFERENTIAL    Narrative:     The following orders were created for panel order CBC & Differential.  Procedure                               Abnormality         Status                     ---------                               -----------         ------                     CBC Auto Differential[[id number]]        Abnormal            Final result                 Please view results for these tests on the individual orders.     Medications   sodium chloride 0.9 % flush 10 mL (has no administration in time range)   nitroglycerin (NITROSTAT) SL tablet 0.4 mg (0.4 mg Sublingual Given 10/14/23 1308)     XR Chest 1 View    Result Date: 10/14/2023  Impression: No acute pulmonary process Electronically Signed: Larry Saavedra MD  10/14/2023 1:12 PM EDT  Workstation ID: [id number]   Prior to Admission medications    Medication Sig Start Date End Date Taking? Authorizing Provider   aspirin (Aspir-Low) 81 MG EC tablet Take 1 tablet by mouth Every Evening. 3/15/13  Yes Yobany Zabala MD   ezetimibe (ZETIA) 10 MG tablet TAKE 1 TABLET DAILY 3/2/23  Yes Rosibel Samuel MD   levocetirizine (Xyzal Allergy 24HR) 5 MG tablet Take 1 tablet by mouth Every Evening. 3/15/13  Yes Yobany Zabala MD   losartan (COZAAR) 25 MG tablet TAKE 1 TABLET BY MOUTH DAILY  "12/1/21  Yes Ward Hankins, DO   omeprazole OTC (PrilOSEC OTC) 20 MG EC tablet Take 1 tablet by mouth Daily. 3/15/13  Yes ProviderYobany MD   albuterol sulfate  (90 Base) MCG/ACT inhaler Inhale 1 puff Every 6 (Six) Hours As Needed. 10/30/21   Yobany Zabala MD   albuterol (PROVENTIL) (2.5 MG/3ML) 0.083% nebulizer solution MIX 1 VIAL IN NEBULIZER EVERY 6 HOURS AS NEEDED FOR WHEEZING 11/13/21 10/14/23  ProviderYobany MD   meclizine (ANTIVERT) 25 MG tablet Take 25 mg by mouth 3 (Three) Times a Day As Needed. 11/4/21 10/14/23  Yobany Zabala MD                                          Medical Decision Making      /70   Pulse 60   Temp 97.5 °F (36.4 °C)   Resp 18   Ht 167.6 cm (66\")   Wt 83.6 kg (184 lb 4.9 oz)   SpO2 100%   BMI 29.75 kg/m²      Chart review: 4/5/2019 negative stress test    Radiology interpretation:  X-rays reviewed independently and interpreted by me: Negative for pulmonary edema  Further interpretation by radiologist as above  Lab interpretation:  Labs all viewed by me and significant for, negative troponin, normal D-dimer, creatinine 0.8    EKG viewed and interpreted by me and corroborated by Dr. Simeon, sinus rhythm rate of 61 no significant change with comparison to previous  comparison: 4/24/2019 sinus rhythm LVH            Appropriate PPE worn during exam.  Patient was placed on cardiac monitor had an IV established labs chest x-ray obtained to evaluate for STEMI non-STEMI angina electrode abnormalities.  We discussed other possible etiologies of her symptoms including pleurisy esophagitis gastritis.  Minor relief with sublingual nitro.  On reexam she resting comfortably.  She will be placed in observation for further evaluation management and cardiac evaluation.         i discussed findings with patient who voices understanding of placement observation  Discussed with Dr. Gerardo    Problems Addressed:  Chest pain, unspecified type: " complicated acute illness or injury    Amount and/or Complexity of Data Reviewed  Labs: ordered.  Radiology: ordered.    Risk  Prescription drug management.  Decision regarding hospitalization.        Final diagnoses:   Chest pain, unspecified type       ED Disposition  ED Disposition       ED Disposition   Decision to Admit    Condition   --    Comment   --               No follow-up provider specified.       Medication List        ASK your doctor about these medications      albuterol sulfate  (90 Base) MCG/ACT inhaler  Commonly known as: PROVENTIL HFA;VENTOLIN HFA;PROAIR HFA  Ask about: Which instructions should I use?                 Mariposa Allen, APRN  10/14/23 1500

## 2023-10-14 NOTE — ED NOTES
Nursing report ED to floor  Tori Brantley  73 y.o.  female    HPI:   Chief Complaint   Patient presents with    Chest Pain     Left chest pain worse when taking a breath.  Pain started 5am this morning, pt woke up with the pain.         Admitting doctor:   Erasto Gerardo MD    Admitting diagnosis:   The encounter diagnosis was Chest pain, unspecified type.    Code status:   Current Code Status       Date Active Code Status Order ID Comments User Context       Not on file            Allergies:   Ibuprofen, Terbinafine, Latex, and Nickel    Isolation:  No active isolations     Fall Risk:  Fall Risk Assessment was completed, and patient is at moderate risk for falls.   Predictive Model Details         7 (Low) Factor Value    Calculated 10/14/2023 14:24 Age 73    Risk of Fall Model Musculoskeletal Assessment WDL     Active Peripheral IV Present     Imaging order in this encounter Present     Respiratory Rate 18     Skin Assessment WDL     Magnesium 1.9 mg/dL     Financial Class Medicare     Drug Use No     Diastolic BP 70     Jason Scale not on file     Peripheral Vascular Assessment WDL     Cardiac Assessment X     Gastrointestinal Assessment WDL     Number of Distinct Medication Classes administered 1     Chloride 102 mmol/L     Total Bilirubin 0.5 mg/dL     Albumin 4.2 g/dL     ALT 18 U/L     Days after Admission 0.095     Potassium 4.2 mmol/L     Creatinine 0.82 mg/dL     Calcium 10 mg/dL         Weight:       10/14/23  1201   Weight: 83.6 kg (184 lb 4.9 oz)       Intake and Output  No intake or output data in the 24 hours ending 10/14/23 1424    Diet:        Most recent vitals:   Vitals:    10/14/23 1202 10/14/23 1301 10/14/23 1401 10/14/23 1403   BP: 155/64 139/62 151/70 151/70   Pulse:  58 60 60   Resp:       Temp:       SpO2: 97% 96% 100%    Weight:       Height:           Active LDAs/IV Access:   Lines, Drains & Airways       Active LDAs       Name Placement date Placement time Site Days    Peripheral IV  10/14/23 1229 Left Antecubital 10/14/23  1229  Antecubital  less than 1                    Skin Condition:   Skin Assessments (last day)       None             Labs (abnormal labs have a star):   Labs Reviewed   CBC WITH AUTO DIFFERENTIAL - Abnormal; Notable for the following components:       Result Value    Lymphocytes, Absolute 3.90 (*)     Eosinophils, Absolute 0.50 (*)     All other components within normal limits   LIPASE - Normal   BNP (IN-HOUSE) - Normal    Narrative:     This assay is used as an aid in the diagnosis of individuals suspected of having heart failure. It can be used as an aid in the diagnosis of acute decompensated heart failure (ADHF) in patients presenting with signs and symptoms of ADHF to the emergency department (ED). In addition, NT-proBNP of <300 pg/mL indicates ADHF is not likely.    Age Range Result Interpretation  NT-proBNP Concentration (pg/mL:      <50             Positive            >450                   Gray                 300-450                    Negative             <300    50-75           Positive            >900                  Gray                300-900                  Negative            <300      >75             Positive            >1800                  Gray                300-1800                  Negative            <300   SINGLE HSTROPONIN T - Normal    Narrative:     High Sensitive Troponin T Reference Range:  <10.0 ng/L- Negative Female for AMI  <15.0 ng/L- Negative Male for AMI  >=10 - Abnormal Female indicating possible myocardial injury.  >=15 - Abnormal Male indicating possible myocardial injury.   Clinicians would have to utilize clinical acumen, EKG, Troponin, and serial changes to determine if it is an Acute Myocardial Infarction or myocardial injury due to an underlying chronic condition.        TSH - Normal   MAGNESIUM - Normal   D-DIMER, QUANTITATIVE - Normal    Narrative:     According to the assay 's published package insert, a normal  "(<0.50 mg/L (FEU)) D-dimer result in conjunction with a non-high clinical probability assessment, excludes deep vein thrombosis (DVT) and pulmonary embolism (PE) with high sensitivity.    D-dimer values increase with age and this can make VTE exclusion of an older population difficult. To address this, the [employer], based on best available evidence and recent guidelines, recommends that clinicians use age-adjusted D-dimer thresholds in patients greater than 50 years of age with: a) a low probability of PE who do not meet all Pulmonary Embolism Rule Out Criteria, or b) in those with intermediate probability of PE.   The formula for an age-adjusted D-dimer cut-off is \"age/100\".  For example, a 60 year old patient would have an age-adjusted cut-off of 0.60 mg/L (FEU) and an 80 year old 0.80 mg/L (FEU).   RAINBOW DRAW    Narrative:     The following orders were created for panel order [facility] Draw.  Procedure                               Abnormality         Status                     ---------                               -----------         ------                     Green Top (Gel)[[id number]]                                                             Lavender Top[[id number]]                                     Final result               Gold Top - SST[[id number]]                                   Final result               Light Blue Top[[id number]]                                   Final result                 Please view results for these tests on the individual orders.   COMPREHENSIVE METABOLIC PANEL    Narrative:     GFR Normal >60  Chronic Kidney Disease <60  Kidney Failure <15    The GFR formula is only valid for adults with stable renal function between ages 18 and 70.   LAVENDER TOP   GOLD TOP - SST   LIGHT BLUE TOP   CBC AND DIFFERENTIAL    Narrative:     The following orders were created for panel order CBC & Differential.  Procedure                               Abnormality         " Status                     ---------                               -----------         ------                     CBC Auto Differential[[id number]]        Abnormal            Final result                 Please view results for these tests on the individual orders.       LOC: Person, Place, Time, and Situation    Telemetry:  Observation Unit    Cardiac Monitoring Ordered: yes    EKG:   ECG 12 Lead Chest Pain   Preliminary Result   HEART RATE= 61  bpm   RR Interval= 982  ms   ID Interval= 168  ms   P Horizontal Axis= -8  deg   P Front Axis= -81  deg   QRSD Interval= 84  ms   QT Interval= 435  ms   QTcB= 439  ms   QRS Axis= -6  deg   T Wave Axis= 29  deg   - ABNORMAL ECG -   Pacemaker spikes or artifacts   Sinus or ectopic atrial rhythm   When compared with ECG of 24-Apr-2019 15:15:36,   Significant change in rhythm   Significant axis, voltage or hypertrophy change   Electronically Signed By:    Date and Time of Study: 2023-10-14 12:08:34          Medications Given in the ED:   Medications   sodium chloride 0.9 % flush 10 mL (has no administration in time range)   nitroglycerin (NITROSTAT) SL tablet 0.4 mg (0.4 mg Sublingual Given 10/14/23 1308)       Imaging results:  XR Chest 1 View    Result Date: 10/14/2023  Impression: No acute pulmonary process Electronically Signed: Larry Saavedra MD  10/14/2023 1:12 PM EDT  Workstation ID: [id number]     Social issues:   Social History     Socioeconomic History    Marital status:    Tobacco Use    Smoking status: Never    Smokeless tobacco: Never   Vaping Use    Vaping Use: Never used   Substance and Sexual Activity    Alcohol use: Not Currently    Drug use: Never       NIH Stroke Scale:  Interval: (not recorded)  1a. Level of Consciousness: (not recorded)  1b. LOC Questions: (not recorded)  1c. LOC Commands: (not recorded)  2. Best Gaze: (not recorded)  3. Visual: (not recorded)  4. Facial Palsy: (not recorded)  5a. Motor Arm, Left: (not recorded)  5b. Motor Arm, Right:  (not recorded)  6a. Motor Leg, Left: (not recorded)  6b. Motor Leg, Right: (not recorded)  7. Limb Ataxia: (not recorded)  8. Sensory: (not recorded)  9. Best Language: (not recorded)  10. Dysarthria: (not recorded)  11. Extinction and Inattention (formerly Neglect): (not recorded)    Total (NIH Stroke Scale): (not recorded)     Additional notable assessment information:     Nursing report ED to floor:  OBS    Melody Mcconnell RN   10/14/23 14:24 EDT

## 2023-10-15 ENCOUNTER — APPOINTMENT (OUTPATIENT)
Dept: NUCLEAR MEDICINE | Facility: HOSPITAL | Age: 73
End: 2023-10-15
Payer: MEDICARE

## 2023-10-15 ENCOUNTER — APPOINTMENT (OUTPATIENT)
Dept: CARDIOLOGY | Facility: HOSPITAL | Age: 73
End: 2023-10-15
Payer: MEDICARE

## 2023-10-15 VITALS
DIASTOLIC BLOOD PRESSURE: 69 MMHG | HEART RATE: 72 BPM | BODY MASS INDEX: 29.41 KG/M2 | HEIGHT: 66 IN | TEMPERATURE: 97.3 F | SYSTOLIC BLOOD PRESSURE: 144 MMHG | OXYGEN SATURATION: 95 % | RESPIRATION RATE: 15 BRPM | WEIGHT: 183 LBS

## 2023-10-15 LAB
ANION GAP SERPL CALCULATED.3IONS-SCNC: 12 MMOL/L (ref 5–15)
BASOPHILS # BLD AUTO: 0.1 10*3/MM3 (ref 0–0.2)
BASOPHILS NFR BLD AUTO: 1.2 % (ref 0–1.5)
BH CV REST NUCLEAR ISOTOPE DOSE: 11 MCI
BH CV STRESS BP STAGE 1: NORMAL
BH CV STRESS DURATION MIN STAGE 1: 3
BH CV STRESS DURATION MIN STAGE 2: 2
BH CV STRESS DURATION SEC STAGE 1: 0
BH CV STRESS DURATION SEC STAGE 2: 4
BH CV STRESS GRADE STAGE 1: 10
BH CV STRESS GRADE STAGE 2: 12
BH CV STRESS HR STAGE 1: 118
BH CV STRESS HR STAGE 2: 136
BH CV STRESS METS STAGE 1: 5
BH CV STRESS METS STAGE 2: 7.5
BH CV STRESS NUCLEAR ISOTOPE DOSE: 33 MCI
BH CV STRESS PROTOCOL 1: NORMAL
BH CV STRESS RECOVERY BP: NORMAL MMHG
BH CV STRESS RECOVERY HR: 88 BPM
BH CV STRESS SPEED STAGE 1: 1.7
BH CV STRESS SPEED STAGE 2: 2.5
BH CV STRESS STAGE 1: 1
BH CV STRESS STAGE 2: 2
BH CV XLRA MEAS LEFT DIST CCA EDV: -15.5 CM/SEC
BH CV XLRA MEAS LEFT DIST CCA PSV: -69.6 CM/SEC
BH CV XLRA MEAS LEFT DIST ICA EDV: -13.7 CM/SEC
BH CV XLRA MEAS LEFT DIST ICA PSV: -39.8 CM/SEC
BH CV XLRA MEAS LEFT ICA/CCA RATIO: 0.72
BH CV XLRA MEAS LEFT PROX CCA EDV: -24.2 CM/SEC
BH CV XLRA MEAS LEFT PROX CCA PSV: -94.9 CM/SEC
BH CV XLRA MEAS LEFT PROX ECA PSV: -60.9 CM/SEC
BH CV XLRA MEAS LEFT PROX ICA EDV: -23 CM/SEC
BH CV XLRA MEAS LEFT PROX ICA PSV: -68.3 CM/SEC
BH CV XLRA MEAS LEFT PROX SCLA PSV: 182 CM/SEC
BH CV XLRA MEAS LEFT VERTEBRAL A EDV: -16.2 CM/SEC
BH CV XLRA MEAS LEFT VERTEBRAL A PSV: -66.5 CM/SEC
BH CV XLRA MEAS RIGHT DIST CCA EDV: -15.5 CM/SEC
BH CV XLRA MEAS RIGHT DIST CCA PSV: -62.1 CM/SEC
BH CV XLRA MEAS RIGHT DIST ICA EDV: -15.9 CM/SEC
BH CV XLRA MEAS RIGHT DIST ICA PSV: -61.9 CM/SEC
BH CV XLRA MEAS RIGHT ICA/CCA RATIO: -0.81
BH CV XLRA MEAS RIGHT PROX CCA EDV: 17.4 CM/SEC
BH CV XLRA MEAS RIGHT PROX CCA PSV: 82.6 CM/SEC
BH CV XLRA MEAS RIGHT PROX ECA PSV: -57 CM/SEC
BH CV XLRA MEAS RIGHT PROX ICA EDV: -20.5 CM/SEC
BH CV XLRA MEAS RIGHT PROX ICA PSV: -66.5 CM/SEC
BH CV XLRA MEAS RIGHT PROX SCLA PSV: 112 CM/SEC
BH CV XLRA MEAS RIGHT VERTEBRAL A EDV: -12.4 CM/SEC
BH CV XLRA MEAS RIGHT VERTEBRAL A PSV: -34.8 CM/SEC
BUN SERPL-MCNC: 14 MG/DL (ref 8–23)
BUN/CREAT SERPL: 20.3 (ref 7–25)
CALCIUM SPEC-SCNC: 9.7 MG/DL (ref 8.6–10.5)
CHLORIDE SERPL-SCNC: 107 MMOL/L (ref 98–107)
CO2 SERPL-SCNC: 26 MMOL/L (ref 22–29)
CREAT SERPL-MCNC: 0.69 MG/DL (ref 0.57–1)
DEPRECATED RDW RBC AUTO: 46.8 FL (ref 37–54)
EGFRCR SERPLBLD CKD-EPI 2021: 91.8 ML/MIN/1.73
EOSINOPHIL # BLD AUTO: 0.6 10*3/MM3 (ref 0–0.4)
EOSINOPHIL NFR BLD AUTO: 6.5 % (ref 0.3–6.2)
ERYTHROCYTE [DISTWIDTH] IN BLOOD BY AUTOMATED COUNT: 14 % (ref 12.3–15.4)
GLUCOSE SERPL-MCNC: 95 MG/DL (ref 65–99)
HCT VFR BLD AUTO: 37.5 % (ref 34–46.6)
HGB BLD-MCNC: 12.4 G/DL (ref 12–15.9)
LV EF NUC BP: 88 %
LYMPHOCYTES # BLD AUTO: 3.8 10*3/MM3 (ref 0.7–3.1)
LYMPHOCYTES NFR BLD AUTO: 45.3 % (ref 19.6–45.3)
MAXIMAL PREDICTED HEART RATE: 147 BPM
MCH RBC QN AUTO: 30.3 PG (ref 26.6–33)
MCHC RBC AUTO-ENTMCNC: 33.1 G/DL (ref 31.5–35.7)
MCV RBC AUTO: 91.4 FL (ref 79–97)
MONOCYTES # BLD AUTO: 0.5 10*3/MM3 (ref 0.1–0.9)
MONOCYTES NFR BLD AUTO: 5.6 % (ref 5–12)
NEUTROPHILS NFR BLD AUTO: 3.5 10*3/MM3 (ref 1.7–7)
NEUTROPHILS NFR BLD AUTO: 41.4 % (ref 42.7–76)
NRBC BLD AUTO-RTO: 0 /100 WBC (ref 0–0.2)
PERCENT MAX PREDICTED HR: 92.52 %
PLATELET # BLD AUTO: 263 10*3/MM3 (ref 140–450)
PMV BLD AUTO: 9.7 FL (ref 6–12)
POTASSIUM SERPL-SCNC: 4.1 MMOL/L (ref 3.5–5.2)
RBC # BLD AUTO: 4.11 10*6/MM3 (ref 3.77–5.28)
RIGHT ARM BP: NORMAL MMHG
SODIUM SERPL-SCNC: 145 MMOL/L (ref 136–145)
STRESS BASELINE BP: NORMAL MMHG
STRESS BASELINE HR: 87 BPM
STRESS PERCENT HR: 109 %
STRESS POST EXERCISE DUR MIN: 5 MIN
STRESS POST EXERCISE DUR SEC: 4 SEC
STRESS POST PEAK BP: NORMAL MMHG
STRESS POST PEAK HR: 136 BPM
STRESS TARGET HR: 125 BPM
WBC NRBC COR # BLD: 8.5 10*3/MM3 (ref 3.4–10.8)

## 2023-10-15 PROCEDURE — 78452 HT MUSCLE IMAGE SPECT MULT: CPT

## 2023-10-15 PROCEDURE — 93880 EXTRACRANIAL BILAT STUDY: CPT

## 2023-10-15 PROCEDURE — 93016 CV STRESS TEST SUPVJ ONLY: CPT | Performed by: NURSE PRACTITIONER

## 2023-10-15 PROCEDURE — A9502 TC99M TETROFOSMIN: HCPCS | Performed by: EMERGENCY MEDICINE

## 2023-10-15 PROCEDURE — 0 TECHNETIUM TETROFOSMIN KIT: Performed by: EMERGENCY MEDICINE

## 2023-10-15 PROCEDURE — G0378 HOSPITAL OBSERVATION PER HR: HCPCS

## 2023-10-15 PROCEDURE — 85025 COMPLETE CBC W/AUTO DIFF WBC: CPT | Performed by: NURSE PRACTITIONER

## 2023-10-15 PROCEDURE — 93018 CV STRESS TEST I&R ONLY: CPT | Performed by: STUDENT IN AN ORGANIZED HEALTH CARE EDUCATION/TRAINING PROGRAM

## 2023-10-15 PROCEDURE — 78452 HT MUSCLE IMAGE SPECT MULT: CPT | Performed by: STUDENT IN AN ORGANIZED HEALTH CARE EDUCATION/TRAINING PROGRAM

## 2023-10-15 PROCEDURE — 80048 BASIC METABOLIC PNL TOTAL CA: CPT | Performed by: NURSE PRACTITIONER

## 2023-10-15 PROCEDURE — 93017 CV STRESS TEST TRACING ONLY: CPT

## 2023-10-15 RX ADMIN — Medication 10 ML: at 10:07

## 2023-10-15 RX ADMIN — PANTOPRAZOLE SODIUM 40 MG: 40 TABLET, DELAYED RELEASE ORAL at 05:45

## 2023-10-15 RX ADMIN — TETROFOSMIN 1 DOSE: 1.38 INJECTION, POWDER, LYOPHILIZED, FOR SOLUTION INTRAVENOUS at 06:59

## 2023-10-15 RX ADMIN — CETIRIZINE HYDROCHLORIDE 10 MG: 10 TABLET, FILM COATED ORAL at 10:06

## 2023-10-15 RX ADMIN — LOSARTAN POTASSIUM 25 MG: 25 TABLET, FILM COATED ORAL at 10:06

## 2023-10-15 RX ADMIN — TETROFOSMIN 1 DOSE: 1.38 INJECTION, POWDER, LYOPHILIZED, FOR SOLUTION INTRAVENOUS at 09:42

## 2023-10-15 NOTE — PLAN OF CARE
Problem: Adult Inpatient Plan of Care  Goal: Plan of Care Review  Outcome: Ongoing, Progressing  Flowsheets (Taken 10/15/2023 0638)  Progress: no change  Plan of Care Reviewed With: patient  Outcome Evaluation: Pt has slept well throughout night, to have Stress test this AM. Will continue plan of care.  Goal: Patient-Specific Goal (Individualized)  Outcome: Ongoing, Progressing  Goal: Absence of Hospital-Acquired Illness or Injury  Outcome: Ongoing, Progressing  Intervention: Identify and Manage Fall Risk  Recent Flowsheet Documentation  Taken 10/15/2023 0400 by Brandy Bailon RN  Safety Promotion/Fall Prevention: safety round/check completed  Taken 10/15/2023 0000 by Brandy Bailon RN  Safety Promotion/Fall Prevention: safety round/check completed  Taken 10/14/2023 2200 by Brandy Bailon RN  Safety Promotion/Fall Prevention: safety round/check completed  Taken 10/14/2023 2000 by Brandy Bailon RN  Safety Promotion/Fall Prevention: safety round/check completed  Intervention: Prevent Skin Injury  Recent Flowsheet Documentation  Taken 10/15/2023 0400 by Brandy Bailon RN  Body Position: position changed independently  Taken 10/15/2023 0200 by Brandy Bailon RN  Body Position: position changed independently  Taken 10/15/2023 0000 by Brandy Bailon RN  Body Position: position changed independently  Taken 10/14/2023 2000 by Brandy Bailon RN  Body Position: position changed independently  Intervention: Prevent and Manage VTE (Venous Thromboembolism) Risk  Recent Flowsheet Documentation  Taken 10/14/2023 2000 by Brandy Bailon RN  Activity Management:   ambulated outside room   ambulated to bathroom  Intervention: Prevent Infection  Recent Flowsheet Documentation  Taken 10/15/2023 0400 by Brandy Bailon RN  Infection Prevention:   hand hygiene promoted   rest/sleep promoted   single patient room provided  Taken 10/15/2023 0200 by Brandy Bailon RN  Infection Prevention:   hand  hygiene promoted   rest/sleep promoted   single patient room provided  Taken 10/15/2023 0000 by Brandy Bailon RN  Infection Prevention:   hand hygiene promoted   rest/sleep promoted   single patient room provided  Taken 10/14/2023 2200 by Brandy Bailon RN  Infection Prevention:   hand hygiene promoted   rest/sleep promoted  Goal: Optimal Comfort and Wellbeing  Outcome: Ongoing, Progressing  Intervention: Provide Person-Centered Care  Recent Flowsheet Documentation  Taken 10/14/2023 2000 by Brandy Bailon RN  Trust Relationship/Rapport:   care explained   choices provided   emotional support provided   empathic listening provided   questions answered   questions encouraged   reassurance provided   thoughts/feelings acknowledged  Goal: Readiness for Transition of Care  Outcome: Ongoing, Progressing     Problem: Chest Pain  Goal: Resolution of Chest Pain Symptoms  Outcome: Ongoing, Progressing     Problem: Pain Acute  Goal: Acceptable Pain Control and Functional Ability  Outcome: Ongoing, Progressing  Intervention: Prevent or Manage Pain  Recent Flowsheet Documentation  Taken 10/14/2023 2000 by Brandy Bailon RN  Medication Review/Management: medications reviewed  Intervention: Optimize Psychosocial Wellbeing  Recent Flowsheet Documentation  Taken 10/14/2023 2000 by Brandy Bailon RN  Diversional Activities: television   Goal Outcome Evaluation:  Plan of Care Reviewed With: patient        Progress: no change  Outcome Evaluation: Pt has slept well throughout night, to have Stress test this AM. Will continue plan of care.

## 2023-10-15 NOTE — PLAN OF CARE
Goal Outcome Evaluation:  Plan of Care Reviewed With: patient           Outcome Evaluation: Pt had stress test today. No chest pain reportd. Will continue to monitor for pain.

## 2023-10-16 LAB
QT INTERVAL: 435 MS
QTC INTERVAL: 439 MS

## 2023-10-16 NOTE — CASE MANAGEMENT/SOCIAL WORK
Case Management Discharge Note      Final Note: Routine home         Selected Continued Care - Discharged on 10/15/2023 Admission date: 10/14/2023 - Discharge disposition: Home or Self Care       Transportation Services  Private: Car    Final Discharge Disposition Code: 01 - home or self-care

## 2023-10-29 NOTE — PROGRESS NOTES
Cardiology Office Follow Up Visit      Primary Care Provider:  Nicholas Robesron MD    Reason for f/u:     Hospital Follow-Up Chest Pain      Subjective       History of Present Illness       Tori Brantley is a 73 y.o. female seen in clinic today for hospital follow-up.    Patient has no prior history of ischemic heart disease.  PMH includes HTN, HLD, TIA, and aortic ectasia.  She recently presented to the ER 10/2023 with c/o chest pain.  She was ruled out for ACS and underwent nuclear stress testing which was negative for ischemia.      Patient tells me that recently she awoke with left-sided chest pain radiating as tingling down her left arm without other accompanying signs and symptoms.  She reports that nitroglycerin in the ER did not help the pain.  This was constant and even now she has residual soreness of the area.  She states that since her ER visit, she has been diagnosed with a left rotator cuff tear and is following with Ortho.  She does endorse tenderness to palpation of the left chest wall area.  She reports that she is able to perform activities such as walking 1/2 mile without unusual difficulty.  She denies shortness of breath.  She reports her blood pressures have been controlled.  She is reported with a history of aortic ectasia.  I reviewed CTA of the chest results from 2020 which show no aortic aneurysm.      ASSESSMENT/PLAN:      Diagnoses and all orders for this visit:    1. Chest pain, unspecified type (Primary)            MEDICAL DECISION MAKING:    Chest pain is not consistent with angina.  This is in the setting of a left rotator cuff tear with palpable chest wall tenderness reproducing symptoms.  Continue risk factor modification with PCP.      RTC in 1 year or as needed.    Past Medical History:   Diagnosis Date    Hyperlipidemia     Hypertension     Stroke        Past Surgical History:   Procedure Laterality Date    CATARACT EXTRACTION, BILATERAL      EYE SURGERY      HYSTERECTOMY  "     LAPAROSCOPIC CHOLECYSTECTOMY           Current Outpatient Medications:     albuterol sulfate  (90 Base) MCG/ACT inhaler, Inhale 1 puff Every 6 (Six) Hours As Needed., Disp: , Rfl:     aspirin (Aspir-Low) 81 MG EC tablet, Take 1 tablet by mouth Every Evening., Disp: , Rfl:     ezetimibe (ZETIA) 10 MG tablet, TAKE 1 TABLET DAILY, Disp: 90 tablet, Rfl: 1    levocetirizine (Xyzal Allergy 24HR) 5 MG tablet, Take 1 tablet by mouth Every Evening., Disp: , Rfl:     losartan (COZAAR) 25 MG tablet, TAKE 1 TABLET BY MOUTH DAILY, Disp: 90 tablet, Rfl: 3    omeprazole OTC (PrilOSEC OTC) 20 MG EC tablet, Take 1 tablet by mouth Daily., Disp: , Rfl:     Social History     Socioeconomic History    Marital status:    Tobacco Use    Smoking status: Never    Smokeless tobacco: Never   Vaping Use    Vaping Use: Never used   Substance and Sexual Activity    Alcohol use: Not Currently    Drug use: Never    Sexual activity: Defer       Family History   Problem Relation Age of Onset    Heart disease Mother     Heart failure Father        The following portions of the patient's history were reviewed and updated as appropriate: allergies, current medications, past family history, past medical history, past social history, past surgical history and problem list.    ROS  /59   Pulse 76   Ht 167.6 cm (66\")   Wt 83 kg (183 lb)   SpO2 94%   BMI 29.54 kg/m² .  Objective     Physical Exam    Physical Exam:  Neuro:  CV:  Resp:  GI:  Ext:  Pysch: AAOx3, no gross deficits  S1S2 RRR, no murmur  Non-labored, CTA  BS+, abd soft  Pedal pulses palp, no edema  Calm and cooperative       Procedures           "

## 2023-10-30 ENCOUNTER — OFFICE VISIT (OUTPATIENT)
Dept: CARDIOLOGY | Facility: CLINIC | Age: 73
End: 2023-10-30
Payer: MEDICARE

## 2023-10-30 VITALS
SYSTOLIC BLOOD PRESSURE: 130 MMHG | WEIGHT: 183 LBS | OXYGEN SATURATION: 94 % | DIASTOLIC BLOOD PRESSURE: 59 MMHG | BODY MASS INDEX: 29.41 KG/M2 | HEIGHT: 66 IN | HEART RATE: 76 BPM

## 2023-10-30 DIAGNOSIS — R07.9 CHEST PAIN, UNSPECIFIED TYPE: Primary | ICD-10-CM

## 2023-10-30 PROCEDURE — 99213 OFFICE O/P EST LOW 20 MIN: CPT | Performed by: NURSE PRACTITIONER

## 2023-10-30 PROCEDURE — 3075F SYST BP GE 130 - 139MM HG: CPT | Performed by: NURSE PRACTITIONER

## 2023-10-30 PROCEDURE — 3078F DIAST BP <80 MM HG: CPT | Performed by: NURSE PRACTITIONER

## 2023-11-06 RX ORDER — EZETIMIBE 10 MG/1
TABLET ORAL
Qty: 90 TABLET | Refills: 3 | Status: SHIPPED | OUTPATIENT
Start: 2023-11-06

## 2024-06-06 ENCOUNTER — HOSPITAL ENCOUNTER (EMERGENCY)
Facility: HOSPITAL | Age: 74
Discharge: HOME | End: 2024-06-06
Attending: EMERGENCY MEDICINE | Admitting: EMERGENCY MEDICINE
Payer: MEDICARE

## 2024-06-06 VITALS
WEIGHT: 182.98 LBS | HEART RATE: 85 BPM | RESPIRATION RATE: 20 BRPM | DIASTOLIC BLOOD PRESSURE: 60 MMHG | OXYGEN SATURATION: 95 % | BODY MASS INDEX: 29.41 KG/M2 | SYSTOLIC BLOOD PRESSURE: 112 MMHG | TEMPERATURE: 97.6 F | HEIGHT: 66 IN

## 2024-06-06 DIAGNOSIS — R55 SYNCOPE, UNSPECIFIED SYNCOPE TYPE: ICD-10-CM

## 2024-06-06 DIAGNOSIS — R07.89 OTHER CHEST PAIN: ICD-10-CM

## 2024-06-06 DIAGNOSIS — T78.40XA ALLERGIC REACTION, INITIAL ENCOUNTER: Primary | ICD-10-CM

## 2024-06-06 LAB
ALBUMIN SERPL-MCNC: 4.3 G/DL (ref 3.5–5.2)
ALBUMIN/GLOB SERPL: 1.5 G/DL
ALP SERPL-CCNC: 63 U/L (ref 39–117)
ALT SERPL W P-5'-P-CCNC: 27 U/L (ref 1–33)
ANION GAP SERPL CALCULATED.3IONS-SCNC: 13.7 MMOL/L (ref 5–15)
AST SERPL-CCNC: 37 U/L (ref 1–32)
BASOPHILS # BLD AUTO: 0.05 10*3/MM3 (ref 0–0.2)
BASOPHILS NFR BLD AUTO: 0.3 % (ref 0–1.5)
BILIRUB SERPL-MCNC: 0.2 MG/DL (ref 0–1.2)
BUN SERPL-MCNC: 21 MG/DL (ref 8–23)
BUN/CREAT SERPL: 18.3 (ref 7–25)
CALCIUM SPEC-SCNC: 10.1 MG/DL (ref 8.6–10.5)
CHLORIDE SERPL-SCNC: 103 MMOL/L (ref 98–107)
CO2 SERPL-SCNC: 23.3 MMOL/L (ref 22–29)
CREAT SERPL-MCNC: 1.15 MG/DL (ref 0.57–1)
DEPRECATED RDW RBC AUTO: 46.1 FL (ref 37–54)
EGFRCR SERPLBLD CKD-EPI 2021: 50.1 ML/MIN/1.73
EOSINOPHIL # BLD AUTO: 0.18 10*3/MM3 (ref 0–0.4)
EOSINOPHIL NFR BLD AUTO: 1.2 % (ref 0.3–6.2)
ERYTHROCYTE [DISTWIDTH] IN BLOOD BY AUTOMATED COUNT: 13.2 % (ref 12.3–15.4)
GEN 5 2HR TROPONIN T REFLEX: 8 NG/L
GLOBULIN UR ELPH-MCNC: 2.8 GM/DL
GLUCOSE SERPL-MCNC: 163 MG/DL (ref 65–99)
HCT VFR BLD AUTO: 45.5 % (ref 34–46.6)
HGB BLD-MCNC: 14.7 G/DL (ref 12–15.9)
IMM GRANULOCYTES # BLD AUTO: 0.09 10*3/MM3 (ref 0–0.05)
IMM GRANULOCYTES NFR BLD AUTO: 0.6 % (ref 0–0.5)
LYMPHOCYTES # BLD AUTO: 2.01 10*3/MM3 (ref 0.7–3.1)
LYMPHOCYTES NFR BLD AUTO: 13.7 % (ref 19.6–45.3)
MCH RBC QN AUTO: 30.6 PG (ref 26.6–33)
MCHC RBC AUTO-ENTMCNC: 32.3 G/DL (ref 31.5–35.7)
MCV RBC AUTO: 94.6 FL (ref 79–97)
MONOCYTES # BLD AUTO: 0.87 10*3/MM3 (ref 0.1–0.9)
MONOCYTES NFR BLD AUTO: 5.9 % (ref 5–12)
NEUTROPHILS NFR BLD AUTO: 11.44 10*3/MM3 (ref 1.7–7)
NEUTROPHILS NFR BLD AUTO: 78.3 % (ref 42.7–76)
NRBC BLD AUTO-RTO: 0 /100 WBC (ref 0–0.2)
PLATELET # BLD AUTO: 301 10*3/MM3 (ref 140–450)
PMV BLD AUTO: 10.9 FL (ref 6–12)
POTASSIUM SERPL-SCNC: 3.9 MMOL/L (ref 3.5–5.2)
PROT SERPL-MCNC: 7.1 G/DL (ref 6–8.5)
RBC # BLD AUTO: 4.81 10*6/MM3 (ref 3.77–5.28)
SODIUM SERPL-SCNC: 140 MMOL/L (ref 136–145)
TROPONIN T DELTA: -1 NG/L
TROPONIN T SERPL HS-MCNC: 9 NG/L
WBC NRBC COR # BLD AUTO: 14.64 10*3/MM3 (ref 3.4–10.8)

## 2024-06-06 PROCEDURE — 25010000002 METHYLPREDNISOLONE PER 125 MG: Performed by: EMERGENCY MEDICINE

## 2024-06-06 PROCEDURE — 96374 THER/PROPH/DIAG INJ IV PUSH: CPT

## 2024-06-06 PROCEDURE — 25810000003 LACTATED RINGERS SOLUTION: Performed by: EMERGENCY MEDICINE

## 2024-06-06 PROCEDURE — 25010000002 DIPHENHYDRAMINE PER 50 MG: Performed by: EMERGENCY MEDICINE

## 2024-06-06 PROCEDURE — 80053 COMPREHEN METABOLIC PANEL: CPT | Performed by: EMERGENCY MEDICINE

## 2024-06-06 PROCEDURE — 96375 TX/PRO/DX INJ NEW DRUG ADDON: CPT

## 2024-06-06 PROCEDURE — 93005 ELECTROCARDIOGRAM TRACING: CPT | Performed by: EMERGENCY MEDICINE

## 2024-06-06 PROCEDURE — 85025 COMPLETE CBC W/AUTO DIFF WBC: CPT | Performed by: EMERGENCY MEDICINE

## 2024-06-06 PROCEDURE — 83520 IMMUNOASSAY QUANT NOS NONAB: CPT | Performed by: EMERGENCY MEDICINE

## 2024-06-06 PROCEDURE — 93005 ELECTROCARDIOGRAM TRACING: CPT

## 2024-06-06 PROCEDURE — 84484 ASSAY OF TROPONIN QUANT: CPT | Performed by: EMERGENCY MEDICINE

## 2024-06-06 PROCEDURE — 99283 EMERGENCY DEPT VISIT LOW MDM: CPT

## 2024-06-06 RX ORDER — METHYLPREDNISOLONE SODIUM SUCCINATE 125 MG/2ML
125 INJECTION, POWDER, LYOPHILIZED, FOR SOLUTION INTRAMUSCULAR; INTRAVENOUS ONCE
Status: COMPLETED | OUTPATIENT
Start: 2024-06-06 | End: 2024-06-06

## 2024-06-06 RX ORDER — DIPHENHYDRAMINE HYDROCHLORIDE 50 MG/ML
25 INJECTION INTRAMUSCULAR; INTRAVENOUS ONCE
Status: COMPLETED | OUTPATIENT
Start: 2024-06-06 | End: 2024-06-06

## 2024-06-06 RX ORDER — FAMOTIDINE 10 MG/ML
20 INJECTION, SOLUTION INTRAVENOUS ONCE
Status: COMPLETED | OUTPATIENT
Start: 2024-06-06 | End: 2024-06-06

## 2024-06-06 RX ADMIN — DIPHENHYDRAMINE HYDROCHLORIDE 25 MG: 50 INJECTION, SOLUTION INTRAMUSCULAR; INTRAVENOUS at 15:47

## 2024-06-06 RX ADMIN — FAMOTIDINE 20 MG: 10 INJECTION INTRAVENOUS at 15:47

## 2024-06-06 RX ADMIN — METHYLPREDNISOLONE SODIUM SUCCINATE 125 MG: 125 INJECTION, POWDER, FOR SOLUTION INTRAMUSCULAR; INTRAVENOUS at 15:47

## 2024-06-06 RX ADMIN — SODIUM CHLORIDE, POTASSIUM CHLORIDE, SODIUM LACTATE AND CALCIUM CHLORIDE 1000 ML: 600; 310; 30; 20 INJECTION, SOLUTION INTRAVENOUS at 15:47

## 2024-06-06 NOTE — ED PROVIDER NOTES
Subjective   History of Present Illness  74-year-old female presents after she was at [place] had syncopal episode.  She also was noted to be having allergic reaction with generalized itching.  She complains of chest discomfort.  She states that she has been losartan for years but was told to start taking it twice a day today.  She had taken it last night and then took dose again today this morning.  She had been taking it just once daily at nighttime prior.  She does report multiple allergies.  Review of Systems    Past Medical History:   Diagnosis Date    Hyperlipidemia     Hypertension     Stroke        Allergies   Allergen Reactions    Ibuprofen Hives    Terbinafine Itching    Dilantin [Phenytoin] Swelling    Latex Hives    Nickel Dermatitis, Itching and Swelling    Propofol Unknown - Low Severity    Bactrim [Sulfamethoxazole-Trimethoprim] Rash    Codeine Rash    Doxycycline Rash    Erythromycin Rash    Iodine Rash    Misc. Sulfonamide Containing Compounds Rash    Norco [Hydrocodone-Acetaminophen] Rash    Zocor [Simvastatin] Rash       Past Surgical History:   Procedure Laterality Date    CATARACT EXTRACTION, BILATERAL      EYE SURGERY      HYSTERECTOMY      LAPAROSCOPIC CHOLECYSTECTOMY         Family History   Problem Relation Age of Onset    Heart disease Mother     Heart failure Father        Social History     Socioeconomic History    Marital status:    Tobacco Use    Smoking status: Never    Smokeless tobacco: Never   Vaping Use    Vaping status: Never Used   Substance and Sexual Activity    Alcohol use: Not Currently    Drug use: Never    Sexual activity: Defer     Prior to Admission medications    Medication Sig Start Date End Date Taking? Authorizing Provider   albuterol sulfate  (90 Base) MCG/ACT inhaler Inhale 1 puff Every 6 (Six) Hours As Needed. 10/30/21   Provider, MD Yobany   aspirin (Aspir-Low) 81 MG EC tablet Take 1 tablet by mouth Every Evening. 3/15/13   Provider  MD Yobany   ezetimibe (ZETIA) 10 MG tablet TAKE 1 TABLET DAILY 11/6/23   Rosibel Samuel MD   levocetirizine (Xyzal Allergy 24HR) 5 MG tablet Take 1 tablet by mouth Every Evening. 3/15/13   Yobany Zabala MD   losartan (COZAAR) 25 MG tablet TAKE 1 TABLET BY MOUTH DAILY 12/1/21   Ward Hankins DO   omeprazole OTC (PrilOSEC OTC) 20 MG EC tablet Take 1 tablet by mouth Daily. 3/15/13   Provider, MD Yobany           Objective   Physical Exam  74-year-old female awake alert.  Generally well-developed well-nourished.  Pupils equal round react light.  Oropharynx airway is patent.  Neck supple chest clear cardiovascular rate and rhythm abdomen soft nontender examination of skin reveals a diffuse erythema.  Procedures           ED Course      Results for orders placed or performed during the hospital encounter of 06/06/24   Comprehensive Metabolic Panel    Specimen: Blood   Result Value Ref Range    Glucose 163 (H) 65 - 99 mg/dL    BUN 21 8 - 23 mg/dL    Creatinine 1.15 (H) 0.57 - 1.00 mg/dL    Sodium 140 136 - 145 mmol/L    Potassium 3.9 3.5 - 5.2 mmol/L    Chloride 103 98 - 107 mmol/L    CO2 23.3 22.0 - 29.0 mmol/L    Calcium 10.1 8.6 - 10.5 mg/dL    Total Protein 7.1 6.0 - 8.5 g/dL    Albumin 4.3 3.5 - 5.2 g/dL    ALT (SGPT) 27 1 - 33 U/L    AST (SGOT) 37 (H) 1 - 32 U/L    Alkaline Phosphatase 63 39 - 117 U/L    Total Bilirubin 0.2 0.0 - 1.2 mg/dL    Globulin 2.8 gm/dL    A/G Ratio 1.5 g/dL    BUN/Creatinine Ratio 18.3 7.0 - 25.0    Anion Gap 13.7 5.0 - 15.0 mmol/L    eGFR 50.1 (L) >60.0 mL/min/1.73   High Sensitivity Troponin T    Specimen: Blood   Result Value Ref Range    HS Troponin T 9 <14 ng/L   CBC Auto Differential    Specimen: Blood   Result Value Ref Range    WBC 14.64 (H) 3.40 - 10.80 10*3/mm3    RBC 4.81 3.77 - 5.28 10*6/mm3    Hemoglobin 14.7 12.0 - 15.9 g/dL    Hematocrit 45.5 34.0 - 46.6 %    MCV 94.6 79.0 - 97.0 fL    MCH 30.6 26.6 - 33.0 pg    MCHC 32.3 31.5 - 35.7  "g/dL    RDW 13.2 12.3 - 15.4 %    RDW-SD 46.1 37.0 - 54.0 fl    MPV 10.9 6.0 - 12.0 fL    Platelets 301 140 - 450 10*3/mm3    Neutrophil % 78.3 (H) 42.7 - 76.0 %    Lymphocyte % 13.7 (L) 19.6 - 45.3 %    Monocyte % 5.9 5.0 - 12.0 %    Eosinophil % 1.2 0.3 - 6.2 %    Basophil % 0.3 0.0 - 1.5 %    Immature Grans % 0.6 (H) 0.0 - 0.5 %    Neutrophils, Absolute 11.44 (H) 1.70 - 7.00 10*3/mm3    Lymphocytes, Absolute 2.01 0.70 - 3.10 10*3/mm3    Monocytes, Absolute 0.87 0.10 - 0.90 10*3/mm3    Eosinophils, Absolute 0.18 0.00 - 0.40 10*3/mm3    Basophils, Absolute 0.05 0.00 - 0.20 10*3/mm3    Immature Grans, Absolute 0.09 (H) 0.00 - 0.05 10*3/mm3    nRBC 0.0 0.0 - 0.2 /100 WBC   High Sensitivity Troponin T 2Hr    Specimen: Blood   Result Value Ref Range    HS Troponin T 8 <14 ng/L    Troponin T Delta -1 >=-4 - <+4 ng/L   ECG 12 Lead Syncope   Result Value Ref Range    QT Interval 383 ms    QTC Interval 459 ms   ECG 12 Lead Chest Pain   Result Value Ref Range    QT Interval 384 ms    QTC Interval 450 ms     No radiology results for the last day  Medications   lactated ringers bolus 1,000 mL (0 mL Intravenous Stopped 6/6/24 1645)   diphenhydrAMINE (BENADRYL) injection 25 mg (25 mg Intravenous Given 6/6/24 1547)   methylPREDNISolone sodium succinate (SOLU-Medrol) injection 125 mg (125 mg Intravenous Given 6/6/24 1547)   famotidine (PEPCID) injection 20 mg (20 mg Intravenous Given 6/6/24 1547)     /58   Pulse 90   Temp 97.6 °F (36.4 °C) (Oral)   Resp 20   Ht 167.6 cm (66\")   Wt 83 kg (182 lb 15.7 oz)   SpO2 95%   BMI 29.53 kg/m²                                          Medical Decision Making  Amount and/or Complexity of Data Reviewed  Labs: ordered.  ECG/medicine tests: ordered.    Risk  Prescription drug management.    Chart review: Patient had exercise stress test with myocardial perfusion October 2023 that showed normal LV function she had normal myocardial perfusion with no evidence of ischemia.  She had " duplex carotid study at the same time that showed less than 50% stenosis bilaterally  Comorbidity: As per past history   Differential: Allergic reaction, angina, arrhythmia felt to be unlikely.  Hypotension  My EKG interpretation: EKG #1 reveals sinus rhythm rate of 86.  EKG #2 reveals sinus rhythm rate of 82.  Lab: Troponin x 2 9 and then 8 for delta of -1 comprehensive metabolic panel glucose 163 creatinine 1.15 CBC white count 14.6 with hemoglobin 14.7 platelet count of 301 78 segs no bands  My Radiology review and interpretation: Chest x-ray considered but patient had resolution of pain and is not short of breath without hypoxia  Discussion/treatment: Patient IV placed.  Was given a liter IV fluid bolus.  Was given Pepcid Solu-Medrol and diphenhydramine.  She had resolution of almost all erythema with treatment.  Repeat evaluation patient is pain-free.  Findings were discussed with her.  She was discharged.  Advised to continue with Xyzal.  She was advised to take Pepcid 20 mg twice a day.  She did have a tryptase level obtained which is pending.  Advised to follow-up with her primary provider for recheck in the next few days.  Return any new or worsening symptoms  Patient was evaluated using appropriate PPE      Final diagnoses:   Allergic reaction, initial encounter   Other chest pain   Syncope, unspecified syncope type       ED Disposition  ED Disposition       ED Disposition   Discharge    Condition   Stable    Comment   --               Nciholas Roberson MD  [address] [address]  [phone]    Schedule an appointment as soon as possible for a visit            Medication List      No changes were made to your prescriptions during this visit.            Doroteo Simeon MD  06/06/24 4216

## 2024-06-06 NOTE — DISCHARGE INSTRUCTIONS
Take Pepcid 20 mg twice a day for the next few days.  Follow-up with Dr. Malagon for recheck, return any new or worsening symptoms

## 2024-06-07 LAB
QT INTERVAL: 383 MS
QT INTERVAL: 384 MS
QTC INTERVAL: 450 MS
QTC INTERVAL: 459 MS

## 2024-06-10 LAB — TRYPTASE SERPL-MCNC: 86.8 UG/L (ref 2.2–13.2)

## 2024-06-10 NOTE — DISCHARGE SUMMARY
[facility]    Nicholas Roberson MD    CHIEF COMPLAINT:     Chest Pain     HISTORY OF PRESENT ILLNESS:    HPI    patient is 73-year-old female who presents amatory with complaints of substernal nonradiating Chest pain that woke her up around 5:00 this morning, she states it is worse with taking a deep breath.  She denies any associated diaphoresis or nausea.  She denies any exacerbation with exertion.  No swelling to her legs or feet, she does have a history of reflux but states that has been at baseline.  She denies any nausea vomiting diarrhea melena hematochezia urinary complaints cough congestion fever.  She reports a family history of cardiac disease.  Was following with Dr. Hankins, last stress 2019, never had a heart cath. No hx smoking/etoh/drug use/vte/thyroid disease.     Past Medical History:   Diagnosis Date    Hyperlipidemia     Hypertension     Stroke      Past Surgical History:   Procedure Laterality Date    CATARACT EXTRACTION, BILATERAL      EYE SURGERY      HYSTERECTOMY      LAPAROSCOPIC CHOLECYSTECTOMY       Family History   Problem Relation Age of Onset    Heart disease Mother     Heart failure Father      Social History     Tobacco Use    Smoking status: Never    Smokeless tobacco: Never   Vaping Use    Vaping Use: Never used   Substance Use Topics    Alcohol use: Not Currently    Drug use: Never     Medications Prior to Admission   Medication Sig Dispense Refill Last Dose    aspirin (Aspir-Low) 81 MG EC tablet Take 1 tablet by mouth Every Evening.   10/14/2023    ezetimibe (ZETIA) 10 MG tablet TAKE 1 TABLET DAILY 90 tablet 1 10/13/2023    levocetirizine (Xyzal Allergy 24HR) 5 MG tablet Take 1 tablet by mouth Every Evening.   10/13/2023    losartan (COZAAR) 25 MG tablet TAKE 1 TABLET BY MOUTH DAILY 90 tablet 3 10/13/2023    omeprazole OTC (PrilOSEC OTC) 20 MG EC tablet Take 1 tablet by mouth Daily.   10/13/2023    albuterol sulfate  (90 Base) MCG/ACT inhaler  PAT:  7-2 @ 3;00   Inhale 1 puff Every 6 (Six) Hours As Needed.        Allergies:  Ibuprofen, Terbinafine, Dilantin [phenytoin], Latex, Nickel, Propofol, Bactrim [sulfamethoxazole-trimethoprim], Codeine, Doxycycline, Erythromycin, Iodine, Misc. sulfonamide containing compounds, Norco [hydrocodone-acetaminophen], and Zocor [simvastatin]      There is no immunization history on file for this patient.        REVIEW OF SYSTEMS:    Review of Systems   Constitutional: Positive for malaise/fatigue.   HENT: Negative.     Eyes: Negative.    Cardiovascular:  Positive for dyspnea on exertion.   Respiratory: Negative.     Endocrine: Negative.    Hematologic/Lymphatic: Negative.    Skin: Negative.    Musculoskeletal:  Positive for back pain and joint pain.   Gastrointestinal: Negative.    Genitourinary: Negative.    Neurological: Negative.    Psychiatric/Behavioral: Negative.     Allergic/Immunologic: Negative.        Vital Signs  Temp:  [97.3 °F (36.3 °C)-98.3 °F (36.8 °C)] 97.3 °F (36.3 °C)  Heart Rate:  [53-85] 72  Resp:  [15-19] 15  BP: (125-157)/(56-74) 144/69          Physical Exam:  Physical Exam  Vitals and nursing note reviewed.   Constitutional:       Appearance: Normal appearance.   HENT:      Head: Normocephalic and atraumatic.      Right Ear: External ear normal.      Left Ear: External ear normal.      Nose: Nose normal.      Mouth/Throat:      Pharynx: Oropharynx is clear.   Eyes:      Extraocular Movements: Extraocular movements intact.      Conjunctiva/sclera: Conjunctivae normal.      Pupils: Pupils are equal, round, and reactive to light.   Cardiovascular:      Rate and Rhythm: Normal rate and regular rhythm.      Pulses: Normal pulses.      Heart sounds: Normal heart sounds.   Pulmonary:      Effort: Pulmonary effort is normal.      Breath sounds: Normal breath sounds.   Abdominal:      General: Bowel sounds are normal.      Palpations: Abdomen is soft.   Musculoskeletal:         General: Tenderness present.      Cervical back:  Normal range of motion.   Skin:     General: Skin is warm.      Capillary Refill: Capillary refill takes less than 2 seconds.   Neurological:      Mental Status: She is alert and oriented to person, place, and time.   Psychiatric:         Mood and Affect: Mood normal.         Behavior: Behavior normal.         Thought Content: Thought content normal.         Judgment: Judgment normal.         Emotional Behavior:    WNL   Debilities:   none  Results Review:    I reviewed the patient's new clinical results.  Lab Results (most recent)       Procedure Component Value Units Date/Time    Basic Metabolic Panel [[id number]]  (Normal) Collected: 10/15/23 0310    Specimen: Blood from Arm, Left Updated: 10/15/23 0438     Glucose 95 mg/dL      BUN 14 mg/dL      Creatinine 0.69 mg/dL      Sodium 145 mmol/L      Potassium 4.1 mmol/L      Chloride 107 mmol/L      CO2 26.0 mmol/L      Calcium 9.7 mg/dL      BUN/Creatinine Ratio 20.3     Anion Gap 12.0 mmol/L      eGFR 91.8 mL/min/1.73     Narrative:      GFR Normal >60  Chronic Kidney Disease <60  Kidney Failure <15    The GFR formula is only valid for adults with stable renal function between ages 18 and 70.    CBC & Differential [[id number]]  (Abnormal) Collected: 10/15/23 0310    Specimen: Blood from Arm, Left Updated: 10/15/23 0416    Narrative:      The following orders were created for panel order CBC & Differential.  Procedure                               Abnormality         Status                     ---------                               -----------         ------                     CBC Auto Differential[[id number]]        Abnormal            Final result                 Please view results for these tests on the individual orders.    CBC Auto Differential [[id number]]  (Abnormal) Collected: 10/15/23 0310    Specimen: Blood from Arm, Left Updated: 10/15/23 0416     WBC 8.50 10*3/mm3      RBC 4.11 10*6/mm3      Hemoglobin 12.4 g/dL      Hematocrit 37.5 %      MCV 91.4 fL       MCH 30.3 pg      MCHC 33.1 g/dL      RDW 14.0 %      RDW-SD 46.8 fl      MPV 9.7 fL      Platelets 263 10*3/mm3      Neutrophil % 41.4 %      Lymphocyte % 45.3 %      Monocyte % 5.6 %      Eosinophil % 6.5 %      Basophil % 1.2 %      Neutrophils, Absolute 3.50 10*3/mm3      Lymphocytes, Absolute 3.80 10*3/mm3      Monocytes, Absolute 0.50 10*3/mm3      Eosinophils, Absolute 0.60 10*3/mm3      Basophils, Absolute 0.10 10*3/mm3      nRBC 0.0 /100 WBC     High Sensitivity Troponin T [[id number]]  (Normal) Collected: 10/14/23 1627    Specimen: Blood from Arm, Left Updated: 10/14/23 1700     HS Troponin T 6 ng/L     Narrative:      High Sensitive Troponin T Reference Range:  <10.0 ng/L- Negative Female for AMI  <15.0 ng/L- Negative Male for AMI  >=10 - Abnormal Female indicating possible myocardial injury.  >=15 - Abnormal Male indicating possible myocardial injury.   Clinicians would have to utilize clinical acumen, EKG, Troponin, and serial changes to determine if it is an Acute Myocardial Infarction or myocardial injury due to an underlying chronic condition.         T4, Free [[id number]]  (Normal) Collected: 10/14/23 1221    Specimen: Blood Updated: 10/14/23 1619     Free T4 1.27 ng/dL     Narrative:      Results may be falsely increased if patient taking Biotin.      Lipid Panel [[id number]]  (Abnormal) Collected: 10/14/23 1221    Specimen: Blood Updated: 10/14/23 1614     Total Cholesterol 214 mg/dL      Triglycerides 159 mg/dL      HDL Cholesterol 78 mg/dL      LDL Cholesterol  109 mg/dL      VLDL Cholesterol 27 mg/dL      LDL/HDL Ratio 1.34    Narrative:      Cholesterol Reference Ranges  (U.S. Department of Health and Human Services ATP III Classifications)    Desirable          <200 mg/dL  Borderline High    200-239 mg/dL  High Risk          >240 mg/dL      Triglyceride Reference Ranges  (U.S. Department of Health and Human Services ATP III Classifications)    Normal           <150 mg/dL  Borderline High   150-199 mg/dL  High             200-499 mg/dL  Very High        >500 mg/dL    HDL Reference Ranges  (U.S. Department of Health and Human Services ATP III Classifications)    Low     <40 mg/dl (major risk factor for CHD)  High    >60 mg/dl ('negative' risk factor for CHD)        LDL Reference Ranges  (U.S. Department of Health and Human Services ATP III Classifications)    Optimal          <100 mg/dL  Near Optimal     100-129 mg/dL  Borderline High  130-159 mg/dL  High             160-189 mg/dL  Very High        >189 mg/dL    Hemoglobin A1c [[id number]]  (Abnormal) Collected: 10/14/23 1221    Specimen: Blood Updated: 10/14/23 1539     Hemoglobin A1C 5.70 %     [place] Draw [[id number]] Collected: 10/14/23 1221    Specimen: Blood Updated: 10/14/23 1330    Narrative:      The following orders were created for panel order [place] Draw.  Procedure                               Abnormality         Status                     ---------                               -----------         ------                     Green Top (Gel)[[id number]]                                                             Lavender Top[[id number]]                                     Final result               Gold Top - SST[[id number]]                                   Final result               Light Blue Top[[id number]]                                   Final result                 Please view results for these tests on the individual orders.    Lavender Top [[id number]] Collected: 10/14/23 1221    Specimen: Blood Updated: 10/14/23 1330     Extra Tube hold for add-on     Comment: Auto resulted       Gold Top - SST [[id number]] Collected: 10/14/23 1221    Specimen: Blood Updated: 10/14/23 1330     Extra Tube Hold for add-ons.     Comment: Auto resulted.       Light Blue Top [[id number]] Collected: 10/14/23 1221    Specimen: Blood Updated: 10/14/23 1330     Extra Tube Hold for add-ons.     Comment: Auto resulted       BNP [[id number]]  (Normal) Collected:  10/14/23 1221    Specimen: Blood Updated: 10/14/23 1310     proBNP 102.4 pg/mL     Narrative:      This assay is used as an aid in the diagnosis of individuals suspected of having heart failure. It can be used as an aid in the diagnosis of acute decompensated heart failure (ADHF) in patients presenting with signs and symptoms of ADHF to the emergency department (ED). In addition, NT-proBNP of <300 pg/mL indicates ADHF is not likely.    Age Range Result Interpretation  NT-proBNP Concentration (pg/mL:      <50             Positive            >450                   Gray                 300-450                    Negative             <300    50-75           Positive            >900                  Gray                300-900                  Negative            <300      >75             Positive            >1800                  Gray                300-1800                  Negative            <300    Comprehensive Metabolic Panel [[id number]] Collected: 10/14/23 1221    Specimen: Blood Updated: 10/14/23 1305     Glucose 93 mg/dL      BUN 14 mg/dL      Creatinine 0.82 mg/dL      Sodium 138 mmol/L      Potassium 4.2 mmol/L      Chloride 102 mmol/L      CO2 23.0 mmol/L      Calcium 10.0 mg/dL      Total Protein 7.5 g/dL      Albumin 4.2 g/dL      ALT (SGPT) 18 U/L      AST (SGOT) 26 U/L      Alkaline Phosphatase 47 U/L      Total Bilirubin 0.5 mg/dL      Globulin 3.3 gm/dL      A/G Ratio 1.3 g/dL      BUN/Creatinine Ratio 17.1     Anion Gap 13.0 mmol/L      eGFR 75.6 mL/min/1.73     Narrative:      GFR Normal >60  Chronic Kidney Disease <60  Kidney Failure <15    The GFR formula is only valid for adults with stable renal function between ages 18 and 70.    Lipase [[id number]]  (Normal) Collected: 10/14/23 1221    Specimen: Blood Updated: 10/14/23 1305     Lipase 41 U/L     Magnesium [[id number]]  (Normal) Collected: 10/14/23 1221    Specimen: Blood Updated: 10/14/23 1305     Magnesium 1.9 mg/dL     Single High Sensitivity  "Troponin T [[id number]]  (Normal) Collected: 10/14/23 1221    Specimen: Blood Updated: 10/14/23 1257     HS Troponin T <6 ng/L     Narrative:      High Sensitive Troponin T Reference Range:  <10.0 ng/L- Negative Female for AMI  <15.0 ng/L- Negative Male for AMI  >=10 - Abnormal Female indicating possible myocardial injury.  >=15 - Abnormal Male indicating possible myocardial injury.   Clinicians would have to utilize clinical acumen, EKG, Troponin, and serial changes to determine if it is an Acute Myocardial Infarction or myocardial injury due to an underlying chronic condition.         TSH [[id number]]  (Normal) Collected: 10/14/23 1221    Specimen: Blood Updated: 10/14/23 1257     TSH 2.930 uIU/mL     D-dimer, Quantitative [[id number]]  (Normal) Collected: 10/14/23 1221    Specimen: Blood Updated: 10/14/23 1251     D-Dimer, Quantitative 0.61 mg/L (FEU)     Narrative:      According to the assay 's published package insert, a normal (<0.50 mg/L (FEU)) D-dimer result in conjunction with a non-high clinical probability assessment, excludes deep vein thrombosis (DVT) and pulmonary embolism (PE) with high sensitivity.    D-dimer values increase with age and this can make VTE exclusion of an older population difficult. To address this, the [employer], based on best available evidence and recent guidelines, recommends that clinicians use age-adjusted D-dimer thresholds in patients greater than 50 years of age with: a) a low probability of PE who do not meet all Pulmonary Embolism Rule Out Criteria, or b) in those with intermediate probability of PE.   The formula for an age-adjusted D-dimer cut-off is \"age/100\".  For example, a 60 year old patient would have an age-adjusted cut-off of 0.60 mg/L (FEU) and an 80 year old 0.80 mg/L (FEU).    CBC & Differential [[id number]]  (Abnormal) Collected: 10/14/23 1221    Specimen: Blood Updated: 10/14/23 1241    Narrative:      The following orders were " created for panel order CBC & Differential.  Procedure                               Abnormality         Status                     ---------                               -----------         ------                     CBC Auto Differential[[id number]]        Abnormal            Final result                 Please view results for these tests on the individual orders.    CBC Auto Differential [[id number]]  (Abnormal) Collected: 10/14/23 1221    Specimen: Blood Updated: 10/14/23 1241     WBC 9.50 10*3/mm3      RBC 4.50 10*6/mm3      Hemoglobin 13.6 g/dL      Hematocrit 41.3 %      MCV 91.7 fL      MCH 30.2 pg      MCHC 32.9 g/dL      RDW 13.9 %      RDW-SD 47.3 fl      MPV 9.6 fL      Platelets 277 10*3/mm3      Neutrophil % 46.7 %      Lymphocyte % 40.6 %      Monocyte % 6.4 %      Eosinophil % 5.5 %      Basophil % 0.8 %      Neutrophils, Absolute 4.40 10*3/mm3      Lymphocytes, Absolute 3.90 10*3/mm3      Monocytes, Absolute 0.60 10*3/mm3      Eosinophils, Absolute 0.50 10*3/mm3      Basophils, Absolute 0.10 10*3/mm3      nRBC 0.2 /100 WBC             Imaging Results (Most Recent)       Procedure Component Value Units Date/Time    XR Chest 1 View [[id number]] Collected: 10/14/23 1312     Updated: 10/14/23 1315    Narrative:      XR CHEST 1 VW    Date of Exam: 10/14/2023 12:30 PM EDT    Indication: chest pain    Comparison: 9/24/2022    Findings:  There are no airspace consolidations. No pleural fluid. No pneumothorax. Small calcified granuloma right upper lobe. The pulmonary vasculature appears within normal limits. The cardiac and mediastinal silhouette appear unremarkable. No acute osseous   abnormality identified.      Impression:      Impression:  No acute pulmonary process      Electronically Signed: Larry Saavedra MD    10/14/2023 1:12 PM EDT    Workstation ID: [id number]          reviewed    ECG/EMG Results (most recent)       Procedure Component Value Units Date/Time    ECG 12 Lead Chest Pain [[id number]]  Collected: 10/14/23 1208     Updated: 10/14/23 1209     QT Interval 435 ms      QTC Interval 439 ms     Narrative:      HEART RATE= 61  bpm  RR Interval= 982  ms  MA Interval= 168  ms  P Horizontal Axis= -8  deg  P Front Axis= -81  deg  QRSD Interval= 84  ms  QT Interval= 435  ms  QTcB= 439  ms  QRS Axis= -6  deg  T Wave Axis= 29  deg  - ABNORMAL ECG -  Pacemaker spikes or artifacts  Sinus or ectopic atrial rhythm  When compared with ECG of 24-Apr-2019 15:15:36,  Significant change in rhythm  Significant axis, voltage or hypertrophy change  Electronically Signed By:   Date and Time of Study: 2023-10-14 12:08:34          reviewed    Results for orders placed during the hospital encounter of 10/14/23    Duplex Carotid Ultrasound CAR    Interpretation Summary    Right internal carotid artery demonstrates a less than 50% stenosis.    Left internal carotid artery demonstrates a less than 50% stenosis.          Microbiology Results (last 10 days)       ** No results found for the last 240 hours. **            Assessment & Plan     Chest pain     Chest pain  Lab Results   Component Value Date    TROPONINT 6 10/14/2023    TROPONINT <6 10/14/2023   -  -Lipase 41, D-dimer negative  -Chest X-ray: No acute cardiopulmonary process noted  -EKG: Pacemaker spikes with sinus rhythm  -Stress Test: no ischemia   -Telemetry  -Continue ASA    Hypertension  -Moderately Controlled   BP Readings from Last 1 Encounters:   10/15/23 144/69   - Continue losartan  - Monitor while admitted    Hyperlipidemia  -Continue zetia ([facility])  -Cannot tolerate statins   -Total cholesterol 214, HDL 78, , triglycerides 159    GERD  -Omeprazole    I discussed the patients findings and my recommendations with patient and family.     Discharge Diagnosis:      Chest pain      Hospital Course  Patient is a 73 y.o. female presented with chest pain.  Patient states that she has seen cardiology in a couple of years.  Patient states she  saw Dr. Hankins few years ago for possible aortic ectasis. Troponins negative.  BMP unremarkable.  Lipase and D-dimer negative.  Chest x-ray showed no acute cardiopulmonary process.  EKG showed sinus rhythm.  Stress test showed no ischemia with low risk study.  Patient educated about diet and exercise control due to abnormal lipid panel and slightly elevated A1c at 5.7.  Patient to monitor blood pressures at home.  Patient to follow-up with cardiologist in 2 weeks.  Patient to follow PCP in 1 to 2 weeks.  Testing recommendations reviewed patient has been at bedside and they agree with treatment plan.  If symptoms worsen patient call [phone] or go to nearest ED.    Past Medical History:     Past Medical History:   Diagnosis Date    Hyperlipidemia     Hypertension     Stroke        Past Surgical History:     Past Surgical History:   Procedure Laterality Date    CATARACT EXTRACTION, BILATERAL      EYE SURGERY      HYSTERECTOMY      LAPAROSCOPIC CHOLECYSTECTOMY         Social History:   Social History     Socioeconomic History    Marital status:    Tobacco Use    Smoking status: Never    Smokeless tobacco: Never   Vaping Use    Vaping Use: Never used   Substance and Sexual Activity    Alcohol use: Not Currently    Drug use: Never    Sexual activity: Defer       Procedures Performed         Consults:   Consults       No orders found for last 30 day(s).            Condition on Discharge:     Stable    Discharge Disposition  Home or Self Care    Discharge Medications     Discharge Medications        Continue These Medications        Instructions Start Date   albuterol sulfate  (90 Base) MCG/ACT inhaler  Commonly known as: PROVENTIL HFA;VENTOLIN HFA;PROAIR HFA   Inhale 1 puff Every 6 (Six) Hours As Needed.      Aspir-Low 81 MG EC tablet  Generic drug: aspirin   Take 1 tablet by mouth Every Evening.      ezetimibe 10 MG tablet  Commonly known as: ZETIA   TAKE 1 TABLET DAILY      losartan 25 MG tablet  Commonly  known as: COZAAR   25 mg, Oral, Daily      PrilOSEC OTC 20 MG EC tablet  Generic drug: omeprazole OTC   Take 1 tablet by mouth Daily.      Xyzal Allergy 24HR 5 MG tablet  Generic drug: levocetirizine   5 mg, Oral, Every Evening               Discharge Diet:   Diet Instructions       Diet: Cardiac Diets; Healthy Heart (2-3 Na+); Regular Texture (IDDSI 7); Thin (IDDSI 0)      Discharge Diet: Cardiac Diets    Cardiac Diet: Healthy Heart (2-3 Na+)    Texture: Regular Texture (IDDSI 7)    Fluid Consistency: Thin (IDDSI 0)            Activity at Discharge:   Activity Instructions       Activity as Tolerated      Measure Blood Pressure              Follow-up Appointments  No future appointments.  Additional Instructions for the Follow-ups that You Need to Schedule       Discharge Follow-up with PCP   As directed       Currently Documented PCP:    Nicholas Roberson MD    PCP Phone Number:    [phone]     Follow Up Details: 7-10 days                Test Results Pending at Discharge       Risk for Readmission (LACE) Score: 2 (10/15/2023  6:00 AM)          NOLAN Benton  10/15/23  14:51 EDT        I spent 35 minutes caring for Tori on this date of service. This time includes time spent by me in the following activities: reviewing tests, obtaining and/or reviewing a separately obtained history, performing a medically appropriate examination and/or evaluation, counseling and educating the patient/family/caregiver, ordering medications, tests, or procedures, referring and communicating with other health care professionals, documenting information in the medical record, independently interpreting results and communicating that information with the patient/family/caregiver, and care coordination.

## 2024-06-13 ENCOUNTER — TELEPHONE (OUTPATIENT)
Dept: CARDIOLOGY | Facility: CLINIC | Age: 74
End: 2024-06-13

## 2024-06-13 NOTE — TELEPHONE ENCOUNTER
Caller: Tori Brantley    Relationship to patient: Self    Best call back number: [phone]    Type of visit: FOLLOW UP     Requested date: ASAP    Additional notes: PT IS CALLING TO SEE IF SHE CAN SCHEDULE AN APPT TO GET HER HEART CHECKED AFTER BEING IN THE ER RECENTLY. SHE ALSO SAID SHE' NOT TAKING HER BP MEDICATION ANYMORE SINCE IT HAS BEEN LOW

## 2024-06-14 ENCOUNTER — APPOINTMENT (OUTPATIENT)
Dept: GENERAL RADIOLOGY | Facility: HOSPITAL | Age: 74
End: 2024-06-14
Payer: MEDICARE

## 2024-06-14 ENCOUNTER — HOSPITAL ENCOUNTER (OUTPATIENT)
Facility: HOSPITAL | Age: 74
Setting detail: OBSERVATION
Discharge: HOME | End: 2024-06-15
Attending: EMERGENCY MEDICINE | Admitting: INTERNAL MEDICINE
Payer: MEDICARE

## 2024-06-14 ENCOUNTER — APPOINTMENT (OUTPATIENT)
Dept: CARDIOLOGY | Facility: HOSPITAL | Age: 74
End: 2024-06-14
Payer: MEDICARE

## 2024-06-14 ENCOUNTER — OFFICE VISIT (OUTPATIENT)
Dept: CARDIOLOGY | Facility: CLINIC | Age: 74
End: 2024-06-14
Payer: MEDICARE

## 2024-06-14 VITALS
WEIGHT: 178 LBS | OXYGEN SATURATION: 97 % | SYSTOLIC BLOOD PRESSURE: 125 MMHG | HEIGHT: 66 IN | HEART RATE: 76 BPM | DIASTOLIC BLOOD PRESSURE: 76 MMHG | BODY MASS INDEX: 28.61 KG/M2

## 2024-06-14 DIAGNOSIS — R55 NEAR SYNCOPE: ICD-10-CM

## 2024-06-14 DIAGNOSIS — R42 DIZZINESS: Primary | ICD-10-CM

## 2024-06-14 DIAGNOSIS — R06.00 DYSPNEA, UNSPECIFIED TYPE: ICD-10-CM

## 2024-06-14 LAB
ANION GAP SERPL CALCULATED.3IONS-SCNC: 13.3 MMOL/L (ref 5–15)
BASOPHILS # BLD AUTO: 0.09 10*3/MM3 (ref 0–0.2)
BASOPHILS NFR BLD AUTO: 0.9 % (ref 0–1.5)
BH CV XLRA MEAS LEFT CAROTID BULB EDV: -15.6 CM/SEC
BH CV XLRA MEAS LEFT CAROTID BULB PSV: -56.3 CM/SEC
BH CV XLRA MEAS LEFT DIST CCA EDV: -18.2 CM/SEC
BH CV XLRA MEAS LEFT DIST CCA PSV: -78 CM/SEC
BH CV XLRA MEAS LEFT DIST ICA EDV: -10.8 CM/SEC
BH CV XLRA MEAS LEFT DIST ICA PSV: -39.4 CM/SEC
BH CV XLRA MEAS LEFT ICA/CCA RATIO: 0.76
BH CV XLRA MEAS LEFT PROX CCA EDV: -21.7 CM/SEC
BH CV XLRA MEAS LEFT PROX CCA PSV: -112 CM/SEC
BH CV XLRA MEAS LEFT PROX ECA PSV: -62.4 CM/SEC
BH CV XLRA MEAS LEFT PROX ICA EDV: -23.4 CM/SEC
BH CV XLRA MEAS LEFT PROX ICA PSV: -84.9 CM/SEC
BH CV XLRA MEAS LEFT PROX SCLA PSV: 234 CM/SEC
BH CV XLRA MEAS LEFT VERTEBRAL A EDV: -20.8 CM/SEC
BH CV XLRA MEAS LEFT VERTEBRAL A PSV: -95.3 CM/SEC
BH CV XLRA MEAS RIGHT CAROTID BULB EDV: 16.2 CM/SEC
BH CV XLRA MEAS RIGHT CAROTID BULB PSV: 53.4 CM/SEC
BH CV XLRA MEAS RIGHT DIST CCA EDV: -15.5 CM/SEC
BH CV XLRA MEAS RIGHT DIST CCA PSV: -64.6 CM/SEC
BH CV XLRA MEAS RIGHT DIST ICA EDV: -15.5 CM/SEC
BH CV XLRA MEAS RIGHT DIST ICA PSV: -51.9 CM/SEC
BH CV XLRA MEAS RIGHT ICA/CCA RATIO: 0.53
BH CV XLRA MEAS RIGHT PROX CCA EDV: -16.2 CM/SEC
BH CV XLRA MEAS RIGHT PROX CCA PSV: -103 CM/SEC
BH CV XLRA MEAS RIGHT PROX ECA PSV: -46.1 CM/SEC
BH CV XLRA MEAS RIGHT PROX ICA EDV: -14.9 CM/SEC
BH CV XLRA MEAS RIGHT PROX ICA PSV: -54.7 CM/SEC
BH CV XLRA MEAS RIGHT PROX SCLA PSV: 180 CM/SEC
BH CV XLRA MEAS RIGHT VERTEBRAL A EDV: -13.3 CM/SEC
BH CV XLRA MEAS RIGHT VERTEBRAL A PSV: -62.4 CM/SEC
BUN SERPL-MCNC: 18 MG/DL (ref 8–23)
BUN/CREAT SERPL: 22.2 (ref 7–25)
CALCIUM SPEC-SCNC: 9.8 MG/DL (ref 8.6–10.5)
CHLORIDE SERPL-SCNC: 103 MMOL/L (ref 98–107)
CO2 SERPL-SCNC: 22.7 MMOL/L (ref 22–29)
CREAT SERPL-MCNC: 0.81 MG/DL (ref 0.57–1)
D DIMER PPP FEU-MCNC: 0.52 MG/L (FEU) (ref 0–0.74)
DEPRECATED RDW RBC AUTO: 45.6 FL (ref 37–54)
EGFRCR SERPLBLD CKD-EPI 2021: 76.3 ML/MIN/1.73
EOSINOPHIL # BLD AUTO: 0.26 10*3/MM3 (ref 0–0.4)
EOSINOPHIL NFR BLD AUTO: 2.6 % (ref 0.3–6.2)
ERYTHROCYTE [DISTWIDTH] IN BLOOD BY AUTOMATED COUNT: 13.3 % (ref 12.3–15.4)
FLUAV RNA RESP QL NAA+PROBE: NOT DETECTED
FLUBV RNA RESP QL NAA+PROBE: NOT DETECTED
GEN 5 2HR TROPONIN T REFLEX: 8 NG/L
GLUCOSE BLDC GLUCOMTR-MCNC: 170 MG/DL (ref 70–105)
GLUCOSE SERPL-MCNC: 109 MG/DL (ref 65–99)
HCT VFR BLD AUTO: 39.5 % (ref 34–46.6)
HGB BLD-MCNC: 12.7 G/DL (ref 12–15.9)
HOLD SPECIMEN: NORMAL
HOLD SPECIMEN: NORMAL
IMM GRANULOCYTES # BLD AUTO: 0.12 10*3/MM3 (ref 0–0.05)
IMM GRANULOCYTES NFR BLD AUTO: 1.2 % (ref 0–0.5)
LYMPHOCYTES # BLD AUTO: 3.59 10*3/MM3 (ref 0.7–3.1)
LYMPHOCYTES NFR BLD AUTO: 36 % (ref 19.6–45.3)
MCH RBC QN AUTO: 30.1 PG (ref 26.6–33)
MCHC RBC AUTO-ENTMCNC: 32.2 G/DL (ref 31.5–35.7)
MCV RBC AUTO: 93.6 FL (ref 79–97)
MONOCYTES # BLD AUTO: 0.85 10*3/MM3 (ref 0.1–0.9)
MONOCYTES NFR BLD AUTO: 8.5 % (ref 5–12)
NEUTROPHILS NFR BLD AUTO: 5.06 10*3/MM3 (ref 1.7–7)
NEUTROPHILS NFR BLD AUTO: 50.8 % (ref 42.7–76)
NRBC BLD AUTO-RTO: 0 /100 WBC (ref 0–0.2)
PLATELET # BLD AUTO: 328 10*3/MM3 (ref 140–450)
PMV BLD AUTO: 10.5 FL (ref 6–12)
POTASSIUM SERPL-SCNC: 4.1 MMOL/L (ref 3.5–5.2)
RBC # BLD AUTO: 4.22 10*6/MM3 (ref 3.77–5.28)
RSV RNA RESP QL NAA+PROBE: NOT DETECTED
SARS-COV-2 RNA RESP QL NAA+PROBE: NOT DETECTED
SODIUM SERPL-SCNC: 139 MMOL/L (ref 136–145)
TROPONIN T DELTA: 1 NG/L
TROPONIN T SERPL HS-MCNC: 7 NG/L
TROPONIN T SERPL HS-MCNC: 8 NG/L
WBC NRBC COR # BLD AUTO: 9.97 10*3/MM3 (ref 3.4–10.8)
WHOLE BLOOD HOLD COAG: NORMAL
WHOLE BLOOD HOLD SPECIMEN: NORMAL

## 2024-06-14 PROCEDURE — 85025 COMPLETE CBC W/AUTO DIFF WBC: CPT | Performed by: EMERGENCY MEDICINE

## 2024-06-14 PROCEDURE — 82948 REAGENT STRIP/BLOOD GLUCOSE: CPT

## 2024-06-14 PROCEDURE — G0378 HOSPITAL OBSERVATION PER HR: HCPCS

## 2024-06-14 PROCEDURE — 93005 ELECTROCARDIOGRAM TRACING: CPT | Performed by: EMERGENCY MEDICINE

## 2024-06-14 PROCEDURE — 84484 ASSAY OF TROPONIN QUANT: CPT | Performed by: EMERGENCY MEDICINE

## 2024-06-14 PROCEDURE — 87637 SARSCOV2&INF A&B&RSV AMP PRB: CPT | Performed by: INTERNAL MEDICINE

## 2024-06-14 PROCEDURE — 25010000002 ENOXAPARIN PER 10 MG: Performed by: INTERNAL MEDICINE

## 2024-06-14 PROCEDURE — 3078F DIAST BP <80 MM HG: CPT | Performed by: NURSE PRACTITIONER

## 2024-06-14 PROCEDURE — 71045 X-RAY EXAM CHEST 1 VIEW: CPT

## 2024-06-14 PROCEDURE — 93880 EXTRACRANIAL BILAT STUDY: CPT | Performed by: SURGERY

## 2024-06-14 PROCEDURE — 99214 OFFICE O/P EST MOD 30 MIN: CPT | Performed by: NURSE PRACTITIONER

## 2024-06-14 PROCEDURE — 96372 THER/PROPH/DIAG INJ SC/IM: CPT

## 2024-06-14 PROCEDURE — 25810000003 SODIUM CHLORIDE 0.9 % SOLUTION: Performed by: INTERNAL MEDICINE

## 2024-06-14 PROCEDURE — 99285 EMERGENCY DEPT VISIT HI MDM: CPT

## 2024-06-14 PROCEDURE — 99214 OFFICE O/P EST MOD 30 MIN: CPT | Performed by: INTERNAL MEDICINE

## 2024-06-14 PROCEDURE — 3074F SYST BP LT 130 MM HG: CPT | Performed by: NURSE PRACTITIONER

## 2024-06-14 PROCEDURE — 85379 FIBRIN DEGRADATION QUANT: CPT | Performed by: EMERGENCY MEDICINE

## 2024-06-14 PROCEDURE — 80048 BASIC METABOLIC PNL TOTAL CA: CPT | Performed by: EMERGENCY MEDICINE

## 2024-06-14 PROCEDURE — 93880 EXTRACRANIAL BILAT STUDY: CPT

## 2024-06-14 PROCEDURE — 84484 ASSAY OF TROPONIN QUANT: CPT | Performed by: INTERNAL MEDICINE

## 2024-06-14 RX ORDER — LOSARTAN POTASSIUM 25 MG/1
25 TABLET ORAL NIGHTLY
Status: DISCONTINUED | OUTPATIENT
Start: 2024-06-14 | End: 2024-06-15 | Stop reason: HOSPADM

## 2024-06-14 RX ORDER — PANTOPRAZOLE SODIUM 40 MG/1
40 TABLET, DELAYED RELEASE ORAL
Status: DISCONTINUED | OUTPATIENT
Start: 2024-06-15 | End: 2024-06-15 | Stop reason: HOSPADM

## 2024-06-14 RX ORDER — ENOXAPARIN SODIUM 100 MG/ML
40 INJECTION SUBCUTANEOUS DAILY
Status: DISCONTINUED | OUTPATIENT
Start: 2024-06-14 | End: 2024-06-15 | Stop reason: HOSPADM

## 2024-06-14 RX ORDER — ONDANSETRON 4 MG/1
4 TABLET, ORALLY DISINTEGRATING ORAL EVERY 6 HOURS PRN
Status: DISCONTINUED | OUTPATIENT
Start: 2024-06-14 | End: 2024-06-15 | Stop reason: HOSPADM

## 2024-06-14 RX ORDER — ACETAMINOPHEN 500 MG
500 TABLET ORAL EVERY 6 HOURS PRN
COMMUNITY

## 2024-06-14 RX ORDER — NITROGLYCERIN 0.4 MG/1
0.4 TABLET SUBLINGUAL
Status: DISCONTINUED | OUTPATIENT
Start: 2024-06-14 | End: 2024-06-15 | Stop reason: HOSPADM

## 2024-06-14 RX ORDER — SODIUM CHLORIDE 0.9 % (FLUSH) 0.9 %
10 SYRINGE (ML) INJECTION AS NEEDED
Status: DISCONTINUED | OUTPATIENT
Start: 2024-06-14 | End: 2024-06-15 | Stop reason: HOSPADM

## 2024-06-14 RX ORDER — SACCHAROMYCES BOULARDII 250 MG
250 CAPSULE ORAL DAILY
COMMUNITY

## 2024-06-14 RX ORDER — ONDANSETRON 2 MG/ML
4 INJECTION INTRAMUSCULAR; INTRAVENOUS EVERY 6 HOURS PRN
Status: DISCONTINUED | OUTPATIENT
Start: 2024-06-14 | End: 2024-06-15 | Stop reason: HOSPADM

## 2024-06-14 RX ORDER — LOSARTAN POTASSIUM 25 MG/1
25 TABLET ORAL DAILY
COMMUNITY

## 2024-06-14 RX ORDER — AMOXICILLIN 250 MG
2 CAPSULE ORAL 2 TIMES DAILY PRN
Status: DISCONTINUED | OUTPATIENT
Start: 2024-06-14 | End: 2024-06-15 | Stop reason: HOSPADM

## 2024-06-14 RX ORDER — SODIUM CHLORIDE 9 MG/ML
40 INJECTION, SOLUTION INTRAVENOUS AS NEEDED
Status: DISCONTINUED | OUTPATIENT
Start: 2024-06-14 | End: 2024-06-15 | Stop reason: HOSPADM

## 2024-06-14 RX ORDER — POLYETHYLENE GLYCOL 3350 17 G/17G
17 POWDER, FOR SOLUTION ORAL DAILY PRN
Status: DISCONTINUED | OUTPATIENT
Start: 2024-06-14 | End: 2024-06-15 | Stop reason: HOSPADM

## 2024-06-14 RX ORDER — BISACODYL 10 MG
10 SUPPOSITORY, RECTAL RECTAL DAILY PRN
Status: DISCONTINUED | OUTPATIENT
Start: 2024-06-14 | End: 2024-06-15 | Stop reason: HOSPADM

## 2024-06-14 RX ORDER — UBIDECARENONE 100 MG
100 CAPSULE ORAL DAILY
COMMUNITY

## 2024-06-14 RX ORDER — SODIUM CHLORIDE 9 MG/ML
100 INJECTION, SOLUTION INTRAVENOUS CONTINUOUS
Status: DISCONTINUED | OUTPATIENT
Start: 2024-06-14 | End: 2024-06-15 | Stop reason: HOSPADM

## 2024-06-14 RX ORDER — BISACODYL 5 MG/1
5 TABLET, DELAYED RELEASE ORAL DAILY PRN
Status: DISCONTINUED | OUTPATIENT
Start: 2024-06-14 | End: 2024-06-15 | Stop reason: HOSPADM

## 2024-06-14 RX ORDER — SODIUM CHLORIDE 0.9 % (FLUSH) 0.9 %
10 SYRINGE (ML) INJECTION EVERY 12 HOURS SCHEDULED
Status: DISCONTINUED | OUTPATIENT
Start: 2024-06-14 | End: 2024-06-15 | Stop reason: HOSPADM

## 2024-06-14 RX ADMIN — Medication 10 ML: at 16:06

## 2024-06-14 RX ADMIN — ENOXAPARIN SODIUM 40 MG: 100 INJECTION SUBCUTANEOUS at 16:06

## 2024-06-14 RX ADMIN — LOSARTAN POTASSIUM 25 MG: 25 TABLET, FILM COATED ORAL at 20:10

## 2024-06-14 RX ADMIN — SODIUM CHLORIDE 100 ML/HR: 9 INJECTION, SOLUTION INTRAVENOUS at 16:07

## 2024-06-14 NOTE — PROGRESS NOTES
"Cardiology Office Follow Up Visit      Primary Care Provider:  Nicholas Roberson MD    Reason for f/u:     Hospital F/U Syncope      Subjective       History of Present Illness       Tori Brantley is a 74 y.o. female seen in clinic today for hospital f/u.    Patient has no prior history of ischemic heart disease.  PMH includes HTN, HLD, TIA (on aspirin 81 mg PO daily), and aortic ectasia.  CTA of the chest results from 2020 which show no aortic aneurysm.  She presented to the ER 10/2023 with c/o chest pain.  She was ruled out for ACS and underwent nuclear stress testing which was negative for ischemia.      She recently presented to the ER following a syncopal episode and was found with an allergic reaction.  She had chest pain during the episode but was r/o for ACS.    Patient is unsure what precipitated her allergic reaction.  She had consumed gluten that day, her losartan had recently been increased to twice daily, and she had started taking digestive enzymes 3 days prior.  Patient tells me she still does not feel well.  She complains of shortness of breath and lightheadedness to the point of presyncope.  Her breathing is a bit labored today and she tells me she feels like she might pass out again.  She denies any further episodes of chest pain.  She denies palpitations.  She has had difficulty performing activities such as climbing stairs.  Her BP is normotensive, and we have performed orthostatics in clinic today which are negative.  She tells me her blood sugar has been normal but she has not checked it today.  She does admit to me that 3 weeks ago she had an 11-hour car ride and reports a \"charley horse\" in her leg sometime thereafter.        ASSESSMENT/PLAN:      Diagnoses and all orders for this visit:    1. Dizziness (Primary)    2. Dyspnea, unspecified type            MEDICAL DECISION MAKING:    I have reviewed recent ER records as above.  I am referring patient back to the ER to exclude the " possibility of pulmonary embolism.  (She does have a hx of stroke).  I would also like to check a 2D echo.  Note she has had normal non-invasive ischemic evaluation last year.  I have contacted the ER and spoke with the receiving PA.      RTC for hospital f/u.    Past Medical History:   Diagnosis Date    Hyperlipidemia     Hypertension     Stroke        Past Surgical History:   Procedure Laterality Date    CATARACT EXTRACTION, BILATERAL      EYE SURGERY      HYSTERECTOMY      LAPAROSCOPIC CHOLECYSTECTOMY           Current Outpatient Medications:     albuterol sulfate  (90 Base) MCG/ACT inhaler, Inhale 1 puff Every 6 (Six) Hours As Needed., Disp: , Rfl:     aspirin (Aspir-Low) 81 MG EC tablet, Take 1 tablet by mouth Every Evening., Disp: , Rfl:     coenzyme Q10 100 MG capsule, Take 1 capsule by mouth Daily., Disp: , Rfl:     ezetimibe (ZETIA) 10 MG tablet, TAKE 1 TABLET DAILY, Disp: 90 tablet, Rfl: 3    levocetirizine (Xyzal Allergy 24HR) 5 MG tablet, Take 1 tablet by mouth Every Evening., Disp: , Rfl:     losartan (COZAAR) 25 MG tablet, TAKE 1 TABLET BY MOUTH DAILY, Disp: 90 tablet, Rfl: 3    omeprazole OTC (PrilOSEC OTC) 20 MG EC tablet, Take 1 tablet by mouth Daily., Disp: , Rfl:     saccharomyces boulardii (FLORASTOR) 250 MG capsule, Take 1 capsule by mouth 2 (Two) Times a Day., Disp: , Rfl:     biotin in ora blend sugar free, Take 800 mL by mouth Daily., Disp: , Rfl:     Social History     Socioeconomic History    Marital status:    Tobacco Use    Smoking status: Never    Smokeless tobacco: Never   Vaping Use    Vaping status: Never Used   Substance and Sexual Activity    Alcohol use: Not Currently    Drug use: Never    Sexual activity: Defer       Family History   Problem Relation Age of Onset    Heart disease Mother     Heart failure Father        The following portions of the patient's history were reviewed and updated as appropriate: allergies, current medications, past family history, past  "medical history, past social history, past surgical history and problem list.    ROS  /76   Pulse 76   Ht 167.6 cm (66\")   Wt 80.7 kg (178 lb)   SpO2 97%   BMI 28.73 kg/m² .  Objective     Physical Exam    Physical Exam:  Neuro:  CV:  Resp:  GI:  Ext:  Pysch: AAOx3, no gross deficits  S1S2 RRR, no murmur  Somewhat tachypneic, CTA  BS+, abd soft  Pedal pulses palp, non-pitting BLE edema  Calm and cooperative  Skin: pale       Procedures           "

## 2024-06-14 NOTE — CASE MANAGEMENT/SOCIAL WORK
Discharge Planning Assessment   Nikolai     Patient Name: Tori Brantley  MRN: [id number]  Today's Date: 6/14/2024    Admit Date: 6/14/2024    Plan: home with spouse   Discharge Needs Assessment       Row Name 06/14/24 1402       Living Environment    People in Home spouse    Current Living Arrangements home    Potentially Unsafe Housing Conditions none    In the past 12 months has the electric, gas, oil, or water company threatened to shut off services in your home? No    Primary Care Provided by self    Provides Primary Care For no one    Family Caregiver if Needed spouse    Quality of Family Relationships helpful;involved;supportive    Able to Return to Prior Arrangements yes       Resource/Environmental Concerns    Resource/Environmental Concerns none    Transportation Concerns none       Transportation Needs    In the past 12 months, has lack of transportation kept you from medical appointments or from getting medications? no    In the past 12 months, has lack of transportation kept you from meetings, work, or from getting things needed for daily living? No       Food Insecurity    Within the past 12 months, you worried that your food would run out before you got the money to buy more. Never true    Within the past 12 months, the food you bought just didn't last and you didn't have money to get more. Never true       Transition Planning    Patient/Family Anticipates Transition to home with family    Patient/Family Anticipated Services at Transition none    Transportation Anticipated car, drives self;family or friend will provide       Discharge Needs Assessment    Readmission Within the Last 30 Days no previous admission in last 30 days    Equipment Currently Used at Home none    Concerns to be Addressed denies needs/concerns at this time    Anticipated Changes Related to Illness none    Equipment Needed After Discharge none    Provided Post Acute Provider List? N/A                   Discharge Plan        Row Name 06/14/24 1403       Plan    Plan home with spouse    Plan Comments met with patient at bedside; I with adls.  no home dme.  denies financial or transportation concerns.  pcp and pharm verified. denies any needs at this time.                  Continued Care and Services - Admitted Since 6/14/2024    No active coordination exists for this encounter.          Demographic Summary       Row Name 06/14/24 1402       General Information    Admission Type observation    Arrived From emergency department    Required Notices Provided Observation Status Notice    Referral Source admission list    Reason for Consult discharge planning    Preferred Language English                   Functional Status       Row Name 06/14/24 1402       Functional Status    Usual Activity Tolerance good    Current Activity Tolerance good       Functional Status, IADL    Medications independent    Meal Preparation independent    Housekeeping independent    Laundry independent    Shopping independent       Mental Status    General Appearance WDL WDL       Mental Status Summary    Recent Changes in Mental Status/Cognitive Functioning no changes                       Patient Forms       Row Name 06/14/24 1402       Patient Forms    Patient Observation Letter Delivered  Moon per reg 6/14                      Denise Doss, RN

## 2024-06-14 NOTE — CONSULTS
"Cardiology Consult Note      REQUESTING PHYSICIAN    Shari Scott DO    PATIENT IDENTIFICATION  Name: Tori Brantley  Age: 74 y.o.  Sex: female  :  1950  MRN: [id number]             REASON FOR CONSULTATION:  74-year-old female known to our service with past medical history of hypertension, dyslipidemia, TIA on antiplatelet therapy, history of aortic ectasia, stiff person syndrome.  No known history of ischemic heart disease.    Exercise nuclear stress testing 10/15/2023 with no ECG evidence of myocardial ischemia.  No exercise-induced arrhythmias.  Normal SPECT images with no scintigraphic evidence of myocardial ischemia or scarring.  Normal functional capacity for age.      CC:  Lightheadedness  Shortness of breath/dyspnea with exertion    HISTORY OF PRESENT ILLNESS:   The patient presented to our office for hospital follow-up office visit with nurse practitioner Lois Steele.  She had recently presented to the [facility] with syncopal episode and was found to have an allergic reaction.  She reports having multiple medication and environmental allergies.  She had reported chest pain but ruled out for acute coronary syndrome.  She reported shortness of breath, dyspnea with exertion that began 2 days prior as well as lightheadedness when leaning forward to the point of near syncope.  Her blood pressure was normotensive in the office and orthostatics were negative.  She reported that 3 weeks ago she had an 11-hour car ride and had experienced a \"charley horse\" in her leg sometime thereafter.  She was advised to go to the emergency department for further evaluation and rule out possible PE.  She was placed on telemetry monitoring to evaluate for any arrhythmia.  Upon my evaluation, she is resting comfortably in bed and denies any lightheadedness or shortness of breath at present.      REVIEW OF SYSTEMS:  Pertinent items are noted in HPI, all other systems reviewed and negative    OBJECTIVE " "  Troponin negative x 1  D-dimer negative  Chest x-ray with no acute findings  EKG normal sinus rhythm    ASSESSMENT  Lightheadedness  Shortness of breath/dyspnea on exertion      PLAN  TTE [id number] read  No evidence of PE  Carotid artery duplex with right and left ICA less than 50% stenosis  No evidence of acute coronary syndrome  Will schedule extended mobile cardiac telemetry prior to discharge      CHF Guideline Directed Medical Therapy  Beta Blocker:   ARNI/ACE/ARB:   SGLT 2 inhibitors:   Diuretics:   Aldosterone Antagonist:   Vasodilators & Nitrates:     Vital Signs  Visit Vitals  /69 (BP Location: Right arm, Patient Position: Lying)   Pulse 72   Temp 97.8 °F (36.6 °C) (Oral)   Resp 17   Ht 167.6 cm (66\")   Wt 80.7 kg (178 lb)   SpO2 99%   BMI 28.73 kg/m²     Oxygen Therapy  SpO2: 99 %  Pulse Oximetry Type: Intermittent  Device (Oxygen Therapy): room air  Flowsheet Rows      Flowsheet Row First Filed Value   Admission Height 167.6 cm (66\") Documented at 06/14/2024 1114   Admission Weight 80.7 kg (178 lb) Documented at 06/14/2024 1114          Intake & Output (last 3 days)       None          Lines, Drains & Airways       Active LDAs       Name Placement date Placement time Site Days    Peripheral IV 06/14/24 1141 Left Antecubital 06/14/24  1141  Antecubital  less than 1                    MEDICAL HISTORY    Past Medical History:   Diagnosis Date    Hyperlipidemia     Hypertension     Stroke         SURGICAL HISTORY    Past Surgical History:   Procedure Laterality Date    CATARACT EXTRACTION, BILATERAL      EYE SURGERY      HYSTERECTOMY      LAPAROSCOPIC CHOLECYSTECTOMY          FAMILY HISTORY    Family History   Problem Relation Age of Onset    Heart disease Mother     Heart failure Father        SOCIAL HISTORY    Social History     Tobacco Use    Smoking status: Never    Smokeless tobacco: Never   Substance Use Topics    Alcohol use: Never        ALLERGIES    Allergies   Allergen Reactions    " "Ibuprofen Hives    Terbinafine Itching    Dilantin [Phenytoin] Swelling    Latex Hives    Nickel Dermatitis, Itching and Swelling    Propofol Unknown - Low Severity    Bactrim [Sulfamethoxazole-Trimethoprim] Rash    Codeine Rash    Doxycycline Rash    Erythromycin Rash    Iodine Rash    Misc. Sulfonamide Containing Compounds Rash    Norco [Hydrocodone-Acetaminophen] Rash    Zocor [Simvastatin] Rash              /69 (BP Location: Right arm, Patient Position: Lying)   Pulse 72   Temp 97.8 °F (36.6 °C) (Oral)   Resp 17   Ht 167.6 cm (66\")   Wt 80.7 kg (178 lb)   SpO2 99%   BMI 28.73 kg/m²   Intake/Output last 3 shifts:  No intake/output data recorded.  Intake/Output this shift:  No intake/output data recorded.    PHYSICAL EXAM:    General: Alert, cooperative, no distress, appears stated age  Head:  Normocephalic, atraumatic, mucous membranes moist  Eyes:  Conjunctivae/corneas clear, EOM's intact     Neck:  Supple,  no adenopathy; no JVD or bruit  Lungs: Clear to auscultation bilaterally, no wheezes, rhonchi or rales are noted  Chest wall: No tenderness  Heart::  Regular rate and rhythm, S1 and S2 normal, no murmur, rub or gallop  Abdomen: Soft, nontender, nondistended, bowel sounds active  Extremities: No cyanosis, clubbing, or edema   Pulses: 2+ and symmetric all extremities  Skin:  No rashes or lesions  Neuro/psych: A&O x3. CN II through XII are grossly intact with appropriate affect      Scheduled Meds:      enoxaparin, 40 mg, Subcutaneous, Daily  losartan, 25 mg, Oral, Nightly  [START ON 6/15/2024] pantoprazole, 40 mg, Oral, Q AM  sodium chloride, 10 mL, Intravenous, Q12H        Continuous Infusions:    sodium chloride, 100 mL/hr, Last Rate: 100 mL/hr (06/14/24 1607)        PRN Meds:      senna-docusate sodium **AND** polyethylene glycol **AND** bisacodyl **AND** bisacodyl    Calcium Replacement - Follow Nurse / BPA Driven Protocol    Magnesium Standard Dose Replacement - Follow Nurse / BPA Driven " "Protocol    nitroglycerin    ondansetron ODT **OR** ondansetron    Phosphorus Replacement - Follow Nurse / BPA Driven Protocol    Potassium Replacement - Follow Nurse / BPA Driven Protocol    [COMPLETED] Insert Peripheral IV **AND** sodium chloride    sodium chloride    sodium chloride        Results Review:     I reviewed the patient's new clinical results.    CBC    Results from last 7 days   Lab Units 06/14/24  1144   WBC 10*3/mm3 9.97   HEMOGLOBIN g/dL 12.7   PLATELETS 10*3/mm3 328     Cr Clearance Estimated Creatinine Clearance: 65.3 mL/min (by C-G formula based on SCr of 0.81 mg/dL).  Coag     HbA1C   Lab Results   Component Value Date    HGBA1C 5.70 (H) 10/14/2023     Blood Glucose   Glucose   Date/Time Value Ref Range Status   06/14/2024 1556 170 (H) 70 - 105 mg/dL Final     Comment:     Serial Number: [id number]:  [id number]     Infection     CMP   Results from last 7 days   Lab Units 06/14/24  1144   SODIUM mmol/L 139   POTASSIUM mmol/L 4.1   CHLORIDE mmol/L 103   CO2 mmol/L 22.7   BUN mg/dL 18   CREATININE mg/dL 0.81   GLUCOSE mg/dL 109*     ABG      UA      THOM  No results found for: \"POCMETH\", \"POCAMPHET\", \"POCBARBITUR\", \"POCBENZO\", \"POCCOCAINE\", \"POCOPIATES\", \"POCOXYCODO\", \"POCPHENCYC\", \"POCPROPOXY\", \"POCTHC\", \"POCTRICYC\"  Lysis Labs   Results from last 7 days   Lab Units 06/14/24  1144   HEMOGLOBIN g/dL 12.7   PLATELETS 10*3/mm3 328   CREATININE mg/dL 0.81     Radiology(recent) XR Chest 1 View    Result Date: 6/14/2024  Impression: No acute cardiopulmonary abnormality is identified. Electronically Signed: Ruby Mcarthur  6/14/2024 12:18 PM EDT  Workstation ID: [id number]       Results from last 7 days   Lab Units 06/14/24  1144   HSTROP T ng/L 8       X-rays, labs reviewed personally by physician.    ECG/EMG Results (most recent)       Procedure Component Value Units Date/Time    ECG 12 Lead Syncope [[id number]] Collected: 06/14/24 1151     Updated: 06/14/24 1152     QT Interval 407 ms      QTC " Interval 447 ms     Narrative:      HEART RATE= 71  bpm  RR Interval= 828  ms  OK Interval= 163  ms  P Horizontal Axis= 9  deg  P Front Axis= 31  deg  QRSD Interval= 88  ms  QT Interval= 407  ms  QTcB= 447  ms  QRS Axis= 2  deg  T Wave Axis= 47  deg  - NORMAL ECG -  Sinus rhythm  Electronically Signed By:   Date and Time of Study: 2024-06-14 11:51:40              Medication Review:   I have reviewed the patient's current medication list  Scheduled Meds:enoxaparin, 40 mg, Subcutaneous, Daily  losartan, 25 mg, Oral, Nightly  [START ON 6/15/2024] pantoprazole, 40 mg, Oral, Q AM  sodium chloride, 10 mL, Intravenous, Q12H      Continuous Infusions:sodium chloride, 100 mL/hr, Last Rate: 100 mL/hr (06/14/24 1607)      PRN Meds:.  senna-docusate sodium **AND** polyethylene glycol **AND** bisacodyl **AND** bisacodyl    Calcium Replacement - Follow Nurse / BPA Driven Protocol    Magnesium Standard Dose Replacement - Follow Nurse / BPA Driven Protocol    nitroglycerin    ondansetron ODT **OR** ondansetron    Phosphorus Replacement - Follow Nurse / BPA Driven Protocol    Potassium Replacement - Follow Nurse / BPA Driven Protocol    [COMPLETED] Insert Peripheral IV **AND** sodium chloride    sodium chloride    sodium chloride    Imaging:  Imaging Results (Last 72 Hours)       Procedure Component Value Units Date/Time    XR Chest 1 View [[id number]] Collected: 06/14/24 1214     Updated: 06/14/24 1220    Narrative:      XR CHEST 1 VW    Date of Exam: 6/14/2024 12:07 PM EDT    Indication: dizziness    Comparison: [facility] chest x-ray 10/14/2023    Findings:  Lungs are adequately expanded and appear clear. Calcified granuloma in the right upper lung is stable. No pneumothorax or large pleural effusion is seen. Cardiomediastinal contours appear stable.      Impression:      Impression:  No acute cardiopulmonary abnormality is identified.      Electronically Signed: Ruby Mcarthur    6/14/2024 12:18 PM EDT    Workstation ID: [id number]       "        NOLAN Bryant  06/14/24  16:44 EDT       EMR Dragon/Transcription:   \"Dictated utilizing Dragon dictation\".                 Electronically signed by NOLAN Bryant, 06/14/24, 4:44 PM EDT.  Copied text in this note has been reviewed by me and is accurate as of 06/14/24.    Cardiology attending:  Seen and examined.  Chart and labs reviewed. Independent interpretations of cardiac testing was performed. History and exam findings are verified with above changes noted.  Assessment and plan notated by APC after being formulated by attending consultant.  Note that greater than 50% of the time spent in care of the patient was provided by attending consultant.    Patient was seen in our office earlier today.  Concern for leg pain following a 11-hour car ride.  Patient also has been having some shortness of breath and dyspnea on exertion.  She was advised to come to the hospital for further evaluation for potential PE.  Will check 2D echocardiogram.  Nuclear stress test was performed in October 2023 with no evidence of ischemia.    Physical Exam:    General: Alert, cooperative, no distress, appears stated age  Head:  Normocephalic, atraumatic, mucous membranes moist  Eyes:  Conjunctivae/corneas clear, EOM's intact     Neck:  Supple,  no bruit  Lungs:            Clear to auscultation bilaterally, no wheezes rhonchi rales are noted  Chest wall: No tenderness  Heart::  Regular rate and rhythm, S1 and S2 normal, 1/6 holosystolic murmur.  No rub or gallop  Abdomen: Soft, nontender, nondistended bowel sounds active  Extremities: No cyanosis, clubbing, or edema  Pulses:            2+ and symmetric all extremities  Skin:  No rashes or lesions  Neuro/psych: A&O x3. CN II through XII are grossly intact with appropriate affect    "

## 2024-06-14 NOTE — ED PROVIDER NOTES
Subjective   History of Present Illness  Chief complaint: General weakness    74-year-old female presents with general weakness.  Patient was seen here about a week ago after a syncopal episode.  It was thought to be related to an allergic reaction at that time.  Patient states she has continued to have generalized weakness and lightheadedness.  She states she felt like she could pass out again today but never actually lost consciousness.  She also reports some tingling in her feet bilaterally.  She followed up with cardiology today and with her continued symptoms they sent her back to the emergency room.  She denies any chest pain.  She states she did have some chest pain with the episode a week ago.    History provided by:  Patient      Review of Systems   Constitutional:  Negative for fever.   HENT:  Negative for congestion.    Respiratory:  Negative for cough and shortness of breath.    Cardiovascular:  Negative for chest pain.   Gastrointestinal:  Negative for abdominal pain.   Musculoskeletal:  Negative for back pain.   Neurological:  Positive for weakness and light-headedness. Negative for headaches.   Psychiatric/Behavioral:  Negative for confusion.        Past Medical History:   Diagnosis Date    Hyperlipidemia     Hypertension     Stroke        Allergies   Allergen Reactions    Ibuprofen Hives    Terbinafine Itching    Dilantin [Phenytoin] Swelling    Latex Hives    Nickel Dermatitis, Itching and Swelling    Propofol Unknown - Low Severity    Bactrim [Sulfamethoxazole-Trimethoprim] Rash    Codeine Rash    Doxycycline Rash    Erythromycin Rash    Iodine Rash    Misc. Sulfonamide Containing Compounds Rash    Norco [Hydrocodone-Acetaminophen] Rash    Zocor [Simvastatin] Rash       Past Surgical History:   Procedure Laterality Date    CATARACT EXTRACTION, BILATERAL      EYE SURGERY      HYSTERECTOMY      LAPAROSCOPIC CHOLECYSTECTOMY         Family History   Problem Relation Age of Onset    Heart disease Mother  "    Heart failure Father        Social History     Socioeconomic History    Marital status:    Tobacco Use    Smoking status: Never    Smokeless tobacco: Never   Vaping Use    Vaping status: Never Used   Substance and Sexual Activity    Alcohol use: Not Currently    Drug use: Never    Sexual activity: Defer       /64   Pulse 69   Temp 97.5 °F (36.4 °C) (Oral)   Resp 16   Ht 167.6 cm (66\")   Wt 80.7 kg (178 lb)   SpO2 96%   BMI 28.73 kg/m²       Objective   Physical Exam  Vitals and nursing note reviewed.   Constitutional:       Appearance: Normal appearance.   HENT:      Head: Normocephalic and atraumatic.      Mouth/Throat:      Mouth: Mucous membranes are moist.   Cardiovascular:      Rate and Rhythm: Normal rate and regular rhythm.      Heart sounds: Normal heart sounds.   Pulmonary:      Effort: Pulmonary effort is normal. No respiratory distress.      Breath sounds: Normal breath sounds.   Abdominal:      General: Bowel sounds are normal.      Palpations: Abdomen is soft.      Tenderness: There is no abdominal tenderness.   Musculoskeletal:      Right lower leg: No edema.      Left lower leg: No edema.   Skin:     General: Skin is warm and dry.   Neurological:      General: No focal deficit present.      Mental Status: She is alert and oriented to person, place, and time.         Procedures           ED Course      My interpretation of EKG shows sinus rhythm, rate 71, no ST elevation                           Results for orders placed or performed during the hospital encounter of 06/14/24   Basic Metabolic Panel    Specimen: Blood   Result Value Ref Range    Glucose 109 (H) 65 - 99 mg/dL    BUN 18 8 - 23 mg/dL    Creatinine 0.81 0.57 - 1.00 mg/dL    Sodium 139 136 - 145 mmol/L    Potassium 4.1 3.5 - 5.2 mmol/L    Chloride 103 98 - 107 mmol/L    CO2 22.7 22.0 - 29.0 mmol/L    Calcium 9.8 8.6 - 10.5 mg/dL    BUN/Creatinine Ratio 22.2 7.0 - 25.0    Anion Gap 13.3 5.0 - 15.0 mmol/L    eGFR 76.3 " >60.0 mL/min/1.73   Single High Sensitivity Troponin T    Specimen: Blood   Result Value Ref Range    HS Troponin T 8 <14 ng/L   D-dimer, Quantitative    Specimen: Blood   Result Value Ref Range    D-Dimer, Quantitative 0.52 0.00 - 0.74 mg/L (FEU)   CBC Auto Differential    Specimen: Blood   Result Value Ref Range    WBC 9.97 3.40 - 10.80 10*3/mm3    RBC 4.22 3.77 - 5.28 10*6/mm3    Hemoglobin 12.7 12.0 - 15.9 g/dL    Hematocrit 39.5 34.0 - 46.6 %    MCV 93.6 79.0 - 97.0 fL    MCH 30.1 26.6 - 33.0 pg    MCHC 32.2 31.5 - 35.7 g/dL    RDW 13.3 12.3 - 15.4 %    RDW-SD 45.6 37.0 - 54.0 fl    MPV 10.5 6.0 - 12.0 fL    Platelets 328 140 - 450 10*3/mm3    Neutrophil % 50.8 42.7 - 76.0 %    Lymphocyte % 36.0 19.6 - 45.3 %    Monocyte % 8.5 5.0 - 12.0 %    Eosinophil % 2.6 0.3 - 6.2 %    Basophil % 0.9 0.0 - 1.5 %    Immature Grans % 1.2 (H) 0.0 - 0.5 %    Neutrophils, Absolute 5.06 1.70 - 7.00 10*3/mm3    Lymphocytes, Absolute 3.59 (H) 0.70 - 3.10 10*3/mm3    Monocytes, Absolute 0.85 0.10 - 0.90 10*3/mm3    Eosinophils, Absolute 0.26 0.00 - 0.40 10*3/mm3    Basophils, Absolute 0.09 0.00 - 0.20 10*3/mm3    Immature Grans, Absolute 0.12 (H) 0.00 - 0.05 10*3/mm3    nRBC 0.0 0.0 - 0.2 /100 WBC   ECG 12 Lead Syncope   Result Value Ref Range    QT Interval 407 ms    QTC Interval 447 ms   Green Top (Gel)   Result Value Ref Range    Extra Tube Hold for add-ons.    Lavender Top   Result Value Ref Range    Extra Tube hold for add-on    Gold Top - SST   Result Value Ref Range    Extra Tube Hold for add-ons.    Light Blue Top   Result Value Ref Range    Extra Tube Hold for add-ons.      XR Chest 1 View    Result Date: 6/14/2024  Impression: No acute cardiopulmonary abnormality is identified. Electronically Signed: Ruby Mcarthur  6/14/2024 12:18 PM EDT  Workstation ID: [id number]               Medical Decision Making  Amount and/or Complexity of Data Reviewed  Labs: ordered.  Radiology: ordered.  ECG/medicine tests:  ordered.    Risk  Prescription drug management.      Patient had the above evaluation.  Results were discussed with the patient.  My interpretation of chest x-ray shows no infiltrate or effusion.  EKG shows no acute ischemia.  White blood cell count is normal.  Troponin is negative.  D-dimer is negative.  BMP is unremarkable.  Patient has remained well-appearing in the emergency room.  Given her recent syncopal episode and continued lightheadedness and near syncope, she will be admitted for further evaluation.  I discussed with the hospitalist who agreed to admit.      Final diagnoses:   Near syncope       ED Disposition  ED Disposition       ED Disposition   Decision to Admit    Condition   --    Comment   Level of Care: Telemetry [5]   Admitting Physician: NATASHA LUNDBERG [[id number]]   Attending Physician: NATASHA LUNDBERG [[id number]]                 No follow-up provider specified.       Medication List      No changes were made to your prescriptions during this visit.            Erasto Gerardo MD  06/14/24 2054

## 2024-06-14 NOTE — H&P
Hospitalist Service  History and Physical      Patient Name: Tori Brantley  : 1950  MRN: [id number]  Primary Care Physician:  Nicholas Roberson MD  Date of admission: 2024      Subjective      Chief Complaint: Lightheadedness    History of Present Illness: Tori Brantley is a 74 y.o. female with PMH of hypertension, hyperlipidemia, TIA, aortic ectasia who presented to [facility] on 2024 complaining of lightheadedness.  Patient was previously admitted in observation unit earlier this month after she presented with syncopal event.  Had stress test done at that time which was normal and patient was discharged home the next day.  Patient states she has been doing well but continues to have lightheadedness and she went for a follow-up appointment with cardiology today and felt lightheadedness and almost passed out so she was sent to the ER.  Patient states her symptoms sort of started 3 weeks ago when she had a  trip out of state to visit her son.  She noticed her blood pressure was high in the 170s there, she took her home losartan dose.  Did have syncopal episode and came to the ER couple weeks ago as noted above.  She continues to have episodes of lightheadedness but denies any further syncope.  She has associated palpitations but denies any chest pain, shortness of breath, diaphoresis, or headache.  Her blood pressure has improved over the past 2 weeks and she remains on low-dose losartan as before.    ROS: Pertinent positives as noted in HPI/subjective.  All other systems were reviewed and are negative.      Personal History     Past Medical History:   Diagnosis Date    Hyperlipidemia     Hypertension     Stroke        Past Surgical History:   Procedure Laterality Date    CATARACT EXTRACTION, BILATERAL      EYE SURGERY      HYSTERECTOMY      LAPAROSCOPIC CHOLECYSTECTOMY         Family History: family history includes Heart disease in her mother; Heart failure in her father.  Otherwise pertinent FHx was reviewed and not pertinent to current issue.    Social History:  reports that she has never smoked. She has never used smokeless tobacco. She reports that she does not currently use alcohol. She reports that she does not use drugs.    Home Medications:  Prior to Admission Medications       Prescriptions Last Dose Informant Patient Reported? Taking?    albuterol sulfate  (90 Base) MCG/ACT inhaler   Yes No    Inhale 1 puff Every 6 (Six) Hours As Needed.    aspirin (Aspir-Low) 81 MG EC tablet   Yes No    Take 1 tablet by mouth Every Evening.    biotin in ora blend sugar free   Yes No    Take 800 mL by mouth Daily.    coenzyme Q10 100 MG capsule   Yes No    Take 1 capsule by mouth Daily.    ezetimibe (ZETIA) 10 MG tablet   No No    TAKE 1 TABLET DAILY    levocetirizine (Xyzal Allergy 24HR) 5 MG tablet   Yes No    Take 1 tablet by mouth Every Evening.    losartan (COZAAR) 25 MG tablet   No No    TAKE 1 TABLET BY MOUTH DAILY    omeprazole OTC (PrilOSEC OTC) 20 MG EC tablet   Yes No    Take 1 tablet by mouth Daily.    saccharomyces boulardii (FLORASTOR) 250 MG capsule   Yes No    Take 1 capsule by mouth 2 (Two) Times a Day.              I have utilized all available, immediate resources to obtain, update, or review the patient's current medications including all prescriptions, over-the-counter products, herbals, cannabis/cannabidiol products, and vitamin.mineral/dietary (nutritional) supplements.    Allergies:  Allergies   Allergen Reactions    Ibuprofen Hives    Terbinafine Itching    Dilantin [Phenytoin] Swelling    Latex Hives    Nickel Dermatitis, Itching and Swelling    Propofol Unknown - Low Severity    Bactrim [Sulfamethoxazole-Trimethoprim] Rash    Codeine Rash    Doxycycline Rash    Erythromycin Rash    Iodine Rash    Misc. Sulfonamide Containing Compounds Rash    Norco [Hydrocodone-Acetaminophen] Rash    Zocor [Simvastatin] Rash       Objective      Vitals:   Temp:  [97.5 °F  (36.4 °C)] 97.5 °F (36.4 °C)  Heart Rate:  [69-76] 69  Resp:  [16] 16  BP: (122-138)/(64-76) 138/64    Physical Exam:    General: Awake, alert, NAD  HEENT: NC/AT, PERRL, EOMI, conjunctivae are clear, mucous membrane moist, oropharynx clear, Trachea midline   Cardiovascular: Regular rate and rhythm, no murmurs  Respiratory: Clear to auscultation bilaterally, no wheezing or rales, unlabored breathing  Abdomen: Soft, nontender, positive bowel sounds, no guarding  Neurologic: A&O, CN grossly intact, moves all extremities spontaneously  Musculoskeletal: Generalized weakness, no other gross deformities  Skin: Warm, dry      Result Review    Result Review:  I have personally reviewed the results from the time of this admission to 6/14/2024 13:04 EDT and agree with these findings:  [x]  Laboratory  [x]  Microbiology  [x]  Radiology  [x]  EKG/Telemetry   [x]  Cardiology/Vascular   []  Pathology  [x]  Old records  []  Other:      Assessment & Plan        Active Hospital Problems:  Active Hospital Problems    Diagnosis     **Dizziness        Assessment/Plan:     Lightheadedness  Generalized weakness  -Previous syncope episode 2 weeks ago which was thought to be related to allergic reaction at that time, work up with negative stress test  -Continues to have symptoms,  rule out any arrhythmia  -Continue to monitor on telemetry while admitted  -EKG on admission with NSR, no acute ST-T changes  -Troponin negative, ACS ruled out  -Check echo and carotid ultrasound  -May need cardiac monitoring on discharge  -Cardiology consult  -PT/OT    Hypertension   -Controlled currently  -Continue losartan, monitor    Chronic GERD  -Continue PPI    VTE Prophylaxis:  Pharmacologic VTE prophylaxis orders are present.    CODE STATUS:    Code Status (Patient has no pulse and is not breathing): CPR (Attempt to Resuscitate)  Medical Interventions (Patient has pulse or is breathing): Full Support      Admission Status:  I believe this patient meets  obs status.    Signature:   Electronically signed by Shari Scott DO, 06/14/24, 1:04 PM EDT.    Part of this note may be an electronic transcription/translation of spoken language to printed text using the Dragon Dictation System.

## 2024-06-15 ENCOUNTER — APPOINTMENT (OUTPATIENT)
Dept: RESPIRATORY THERAPY | Facility: HOSPITAL | Age: 74
End: 2024-06-15
Payer: MEDICARE

## 2024-06-15 ENCOUNTER — APPOINTMENT (OUTPATIENT)
Dept: CARDIOLOGY | Facility: HOSPITAL | Age: 74
End: 2024-06-15
Payer: MEDICARE

## 2024-06-15 VITALS
BODY MASS INDEX: 28.28 KG/M2 | SYSTOLIC BLOOD PRESSURE: 132 MMHG | WEIGHT: 176 LBS | HEIGHT: 66 IN | OXYGEN SATURATION: 96 % | HEART RATE: 75 BPM | DIASTOLIC BLOOD PRESSURE: 63 MMHG | RESPIRATION RATE: 16 BRPM | TEMPERATURE: 97.7 F

## 2024-06-15 LAB
ANION GAP SERPL CALCULATED.3IONS-SCNC: 9.1 MMOL/L (ref 5–15)
BUN SERPL-MCNC: 10 MG/DL (ref 8–23)
BUN/CREAT SERPL: 14.9 (ref 7–25)
CALCIUM SPEC-SCNC: 9.6 MG/DL (ref 8.6–10.5)
CHLORIDE SERPL-SCNC: 107 MMOL/L (ref 98–107)
CO2 SERPL-SCNC: 24.9 MMOL/L (ref 22–29)
CREAT SERPL-MCNC: 0.67 MG/DL (ref 0.57–1)
DEPRECATED RDW RBC AUTO: 49.1 FL (ref 37–54)
EGFRCR SERPLBLD CKD-EPI 2021: 91.8 ML/MIN/1.73
ERYTHROCYTE [DISTWIDTH] IN BLOOD BY AUTOMATED COUNT: 13.6 % (ref 12.3–15.4)
GLUCOSE SERPL-MCNC: 94 MG/DL (ref 65–99)
HCT VFR BLD AUTO: 40.4 % (ref 34–46.6)
HGB BLD-MCNC: 12.5 G/DL (ref 12–15.9)
MAGNESIUM SERPL-MCNC: 2.3 MG/DL (ref 1.6–2.4)
MCH RBC QN AUTO: 30.3 PG (ref 26.6–33)
MCHC RBC AUTO-ENTMCNC: 30.9 G/DL (ref 31.5–35.7)
MCV RBC AUTO: 98.1 FL (ref 79–97)
PHOSPHATE SERPL-MCNC: 3.4 MG/DL (ref 2.5–4.5)
PLATELET # BLD AUTO: 267 10*3/MM3 (ref 140–450)
PMV BLD AUTO: 10.7 FL (ref 6–12)
POTASSIUM SERPL-SCNC: 4.3 MMOL/L (ref 3.5–5.2)
RBC # BLD AUTO: 4.12 10*6/MM3 (ref 3.77–5.28)
SODIUM SERPL-SCNC: 141 MMOL/L (ref 136–145)
WBC NRBC COR # BLD AUTO: 8.22 10*3/MM3 (ref 3.4–10.8)

## 2024-06-15 PROCEDURE — 93356 MYOCRD STRAIN IMG SPCKL TRCK: CPT | Performed by: INTERNAL MEDICINE

## 2024-06-15 PROCEDURE — 93306 TTE W/DOPPLER COMPLETE: CPT

## 2024-06-15 PROCEDURE — 80048 BASIC METABOLIC PNL TOTAL CA: CPT | Performed by: INTERNAL MEDICINE

## 2024-06-15 PROCEDURE — G0378 HOSPITAL OBSERVATION PER HR: HCPCS

## 2024-06-15 PROCEDURE — 84100 ASSAY OF PHOSPHORUS: CPT | Performed by: INTERNAL MEDICINE

## 2024-06-15 PROCEDURE — 93356 MYOCRD STRAIN IMG SPCKL TRCK: CPT

## 2024-06-15 PROCEDURE — 99214 OFFICE O/P EST MOD 30 MIN: CPT | Performed by: INTERNAL MEDICINE

## 2024-06-15 PROCEDURE — 93306 TTE W/DOPPLER COMPLETE: CPT | Performed by: INTERNAL MEDICINE

## 2024-06-15 PROCEDURE — 83735 ASSAY OF MAGNESIUM: CPT | Performed by: INTERNAL MEDICINE

## 2024-06-15 PROCEDURE — 85027 COMPLETE CBC AUTOMATED: CPT | Performed by: INTERNAL MEDICINE

## 2024-06-15 RX ADMIN — PANTOPRAZOLE SODIUM 40 MG: 40 TABLET, DELAYED RELEASE ORAL at 05:48

## 2024-06-15 NOTE — DISCHARGE SUMMARY
Hospitalist Service   DISCHARGE SUMMARY    Patient Name: Tori Brantley  : 1950  MRN: [id number]    Date of Admission: 2024  Date of Discharge:  06/15/24    Primary Care Physician: Nicholas Roberson MD      Presenting Problem:   Dizziness [R42]  Near syncope [R55]    Active and Resolved Hospital Problems:  Active Hospital Problems    Diagnosis POA    **Dizziness [R42] Yes      Resolved Hospital Problems   No resolved problems to display.         Hospital Course     Hospital Course:  Tori Brantley is a 74 y.o. female with PMH of hypertension, hyperlipidemia, TIA, aortic ectasia who presented to [facility] on 2024 complaining of lightheadedness.  Patient was previously admitted in observation unit earlier this month after she presented with syncopal event.  Had stress test done at that time which was normal and patient was discharged home the next day.  Patient states she has been doing well but continues to have lightheadedness and she went for a follow-up appointment with cardiology today and felt lightheadedness and almost passed out so she was sent to the ER.  Patient states her symptoms sort of started 3 weeks ago when she had a  trip out of state to visit her son.  She noticed her blood pressure was high in the 170s there, she took her home losartan dose.  Did have syncopal episode and came to the ER couple weeks ago as noted above.  She continues to have episodes of lightheadedness but denies any further syncope.  She has associated palpitations but denies any chest pain, shortness of breath, diaphoresis, or headache.  Her blood pressure has improved over the past 2 weeks and she remains on low-dose losartan as before.  Cardiology was consulted.  Carotid Dopplers negative for significant stenosis.  Echo ordered per cardiology.  Patient seems to have overall improved, able to ambulate by herself without any difficulty.  Dizziness has improved.  Mobile cardiac  monitoring ordered on discharge per cardiology.  No further inpatient workup needed.  Patient is medically stable to discharge home with follow-up with PCP and cardiology as an outpatient.        DISCHARGE Follow Up Recommendations for labs and diagnostics:   Follow-up with PCP and cardiology    Reasons For Change In Medications and Indications for New Medications:  No changes    Day of Discharge     Vital Signs:  Temp:  [97.5 °F (36.4 °C)-97.8 °F (36.6 °C)] 97.5 °F (36.4 °C)  Heart Rate:  [69-78] 78  Resp:  [16-17] 16  BP: (122-162)/(64-74) 140/69  Flow (L/min):  [2] 2    Physical Exam:  General: Awake, alert, NAD  Eyes: PERRL, EOMI, conjunctivae are clear  Cardiovascular: Regular rate and rhythm, no murmurs  Respiratory: Clear to auscultation bilaterally, no wheezing or rales, unlabored breathing  Abdomen: Soft, nontender, positive bowel sounds, no guarding  Neurologic: A&O, CN grossly intact, moves all extremities spontaneously  Musculoskeletal: Normal range of motion, no other gross deformities  Skin: Warm, dry        Pertinent  and/or Most Recent Results     LAB RESULTS:      Lab 06/15/24  0552 06/14/24  1144   WBC 8.22 9.97   HEMOGLOBIN 12.5 12.7   HEMATOCRIT 40.4 39.5   PLATELETS 267 328   NEUTROS ABS  --  5.06   IMMATURE GRANS (ABS)  --  0.12*   LYMPHS ABS  --  3.59*   MONOS ABS  --  0.85   EOS ABS  --  0.26   MCV 98.1* 93.6         Lab 06/15/24  0552 06/14/24  1144   SODIUM 141 139   POTASSIUM 4.3 4.1   CHLORIDE 107 103   CO2 24.9 22.7   ANION GAP 9.1 13.3   BUN 10 18   CREATININE 0.67 0.81   EGFR 91.8 76.3   GLUCOSE 94 109*   CALCIUM 9.6 9.8   MAGNESIUM 2.3  --    PHOSPHORUS 3.4  --              Lab 06/14/24  1816 06/14/24  1625 06/14/24  1144   HSTROP T 8 7 8                 Brief Urine Lab Results       None          Microbiology Results (last 10 days)       Procedure Component Value - Date/Time    COVID-19, FLU A/B, RSV PCR 1 HR TAT - Swab, Nasopharynx [[id number]]  (Normal) Collected: 06/14/24 8374     Lab Status: Final result Specimen: Swab from Nasopharynx Updated: 06/14/24 1848     COVID19 Not Detected     Influenza A PCR Not Detected     Influenza B PCR Not Detected     RSV, PCR Not Detected    Narrative:      Fact sheet for providers: https://www.fda.gov/media/503219/download    Fact sheet for patients: https://www.fda.gov/media/460913/download    Test performed by PCR.            XR Chest 1 View    Result Date: 6/14/2024  Impression: Impression: No acute cardiopulmonary abnormality is identified. Electronically Signed: Ruby Mcarthur  6/14/2024 12:18 PM EDT  Workstation ID: [id number]     Results for orders placed during the hospital encounter of 06/14/24    Duplex Carotid Ultrasound CAR    Interpretation Summary    Right internal carotid artery demonstrates a less than 50% stenosis.    Left internal carotid artery demonstrates a less than 50% stenosis.      Results for orders placed during the hospital encounter of 06/14/24    Duplex Carotid Ultrasound CAR    Interpretation Summary    Right internal carotid artery demonstrates a less than 50% stenosis.    Left internal carotid artery demonstrates a less than 50% stenosis.          Labs Pending at Discharge:      Procedures Performed           Consults:   Consults       Date and Time Order Name Status Description    6/14/2024  1:04 PM Inpatient Cardiology Consult Completed     6/14/2024 12:44 PM Hospitalist (on-call MD unless specified)                Discharge Details        Discharge Medications        Continue These Medications        Instructions Start Date   acetaminophen 500 MG tablet  Commonly known as: TYLENOL   500 mg, Oral, Every 6 Hours PRN      albuterol sulfate  (90 Base) MCG/ACT inhaler  Commonly known as: PROVENTIL HFA;VENTOLIN HFA;PROAIR HFA   Inhale 1 puff Every 6 (Six) Hours As Needed.      Aspir-Low 81 MG EC tablet  Generic drug: aspirin   Take 1 tablet by mouth Every Evening.      biotin in ora blend sugar free   2,000 mg, Oral,  Daily      coenzyme Q10 100 MG capsule   100 mg, Oral, Daily      ezetimibe 10 MG tablet  Commonly known as: ZETIA   TAKE 1 TABLET DAILY      losartan 25 MG tablet  Commonly known as: COZAAR   25 mg, Oral, Daily      PrilOSEC OTC 20 MG EC tablet  Generic drug: omeprazole OTC   Take 1 tablet by mouth Daily.      saccharomyces boulardii 250 MG capsule  Commonly known as: FLORASTOR   250 mg, Oral, Daily      Xyzal Allergy 24HR 5 MG tablet  Generic drug: levocetirizine   5 mg, Oral, Every Evening               Allergies   Allergen Reactions    Ibuprofen Hives    Terbinafine Itching    Dilantin [Phenytoin] Swelling    Latex Hives    Nickel Dermatitis, Itching and Swelling    Propofol Unknown - Low Severity    Bactrim [Sulfamethoxazole-Trimethoprim] Rash    Codeine Rash    Doxycycline Rash    Erythromycin Rash    Iodine Rash    Misc. Sulfonamide Containing Compounds Rash    Norco [Hydrocodone-Acetaminophen] Rash    Zocor [Simvastatin] Rash         Discharge Disposition:      Diet:  Hospital:  Diet Order   Procedures    Diet: Cardiac, Gastrointestinal; Healthy Heart (2-3 Na+); Gluten-Sensitive; Fluid Consistency: Thin (IDDSI 0)         Discharge Activity:         CODE STATUS:  Code Status and Medical Interventions:   Ordered at: 06/14/24 1301     Code Status (Patient has no pulse and is not breathing):    CPR (Attempt to Resuscitate)     Medical Interventions (Patient has pulse or is breathing):    Full Support         Future Appointments   Date Time Provider Department Center   9/13/2024 11:00 AM Ward Hankins DO [facility]           Time spent on Discharge including face to face service:  35 minutes    Signature:    Electronically signed by Shari Scott DO, 06/15/24, 10:14 AM EDT.      Part of this note may be an electronic transcription/translation of spoken language to printed text using the Dragon Dictation System.

## 2024-06-15 NOTE — PROGRESS NOTES
"Cardiology Progress  Note      Patient Care Team:  Nicholas oRberson MD as PCP - General  Nicholas Roberson MD as PCP - Family Medicine    PATIENT IDENTIFICATION  Name: Tori Brantley  Age: 74 y.o.  Sex: female  :  1950  MRN: [id number]      Length of stay:    LOS: 0 days           REASON FOR FOLLOW-UP:  Lightheadedness  Shortness of breath      INTERVAL HISTORY  Patient seen and examined, chart and labs reviewed.  Patient denies any further lightheadedness or shortness of breath at present.  She denies any GI complaints.  Has been up in room without difficulty.      SUBJECTIVE    No dizziness  No shortness of breath  No chest discomfort      REVIEW OF SYSTEMS:  Pertinent items are noted in HPI, all other systems reviewed and negative    OBJECTIVE       ASSESSMENT  Lightheadedness  Shortness of breath/dyspnea on exertion      RECOMMENDATIONS  No evidence of PE  Carotid artery duplex with right and left ICA less than 50% stenosis  No evidence of acute coronary syndrome  Will schedule extended mobile cardiac telemetry prior to discharge  Follow-up echo read          CHF Guideline Directed Medical Therapy  Beta Blocker:   ARNI/ACE/ARB:   SGLT 2 inhibitors:   Diuretics:   Aldosterone Antagonist:   Vasodilators & Nitrates:       Vital Signs  Visit Vitals  /63 (BP Location: Left arm, Patient Position: Lying)   Pulse 75   Temp 97.7 °F (36.5 °C) (Oral)   Resp 16   Ht 167.6 cm (66\")   Wt 79.8 kg (176 lb)   SpO2 96%   BMI 28.41 kg/m²     Oxygen Therapy  SpO2: 96 %  Pulse Oximetry Type: Continuous  Device (Oxygen Therapy): room air  Flow (L/min): 2  Flowsheet Rows      Flowsheet Row First Filed Value   Admission Height 167.6 cm (66\") Documented at 2024 1114   Admission Weight 80.7 kg (178 lb) Documented at 2024 1114          Intake & Output (last 3 days)          07 0700  0701   07 0701  06/15 0700 06/15 0701   0700    P.O.    360    Total Intake(mL/kg)    " "360 (4.5)    Net    +360            Urine Unmeasured Occurrence   4 x           Lines, Drains & Airways       Active LDAs       Name Placement date Placement time Site Days    Peripheral IV 06/14/24 1141 Left Antecubital 06/14/24  1141  Antecubital  1                           /63 (BP Location: Left arm, Patient Position: Lying)   Pulse 75   Temp 97.7 °F (36.5 °C) (Oral)   Resp 16   Ht 167.6 cm (66\")   Wt 79.8 kg (176 lb)   SpO2 96%   BMI 28.41 kg/m²   Intake/Output last 3 shifts:  No intake/output data recorded.  Intake/Output this shift:  I/O this shift:  In: 360 [P.O.:360]  Out: -     PHYSICAL EXAM:    General: Alert, cooperative, no distress, appears stated age  Head:  Normocephalic, atraumatic, mucous membranes moist  Eyes:  Conjunctivae/corneas clear, EOM's intact     Neck:  Supple,  no adenopathy; no JVD or bruit  Lungs:  Clear to auscultation bilaterally, no wheezes, rhonchi or rales are noted  Chest wall: No tenderness  Heart::  Regular rate and rhythm, S1 and S2 normal, no murmur, rub or gallop  Abdomen: Soft, nontender, nondistended, bowel sounds active  Extremities: No cyanosis, clubbing, or edema   Pulses: 2+ and symmetric all extremities  Skin:  No rashes or lesions  Neuro/psych: A&O x3. CN II through XII are grossly intact with appropriate affect        Scheduled Meds:      enoxaparin, 40 mg, Subcutaneous, Daily  losartan, 25 mg, Oral, Nightly  pantoprazole, 40 mg, Oral, Q AM  sodium chloride, 10 mL, Intravenous, Q12H        Continuous Infusions:    sodium chloride, 100 mL/hr, Last Rate: 100 mL/hr (06/14/24 1607)        PRN Meds:      senna-docusate sodium **AND** polyethylene glycol **AND** bisacodyl **AND** bisacodyl    Calcium Replacement - Follow Nurse / BPA Driven Protocol    Magnesium Standard Dose Replacement - Follow Nurse / BPA Driven Protocol    nitroglycerin    ondansetron ODT **OR** ondansetron    Phosphorus Replacement - Follow Nurse / BPA Driven Protocol    Potassium " "Replacement - Follow Nurse / BPA Driven Protocol    [COMPLETED] Insert Peripheral IV **AND** sodium chloride    sodium chloride    sodium chloride        Results Review:     I reviewed the patient's new clinical results.    CBC    Results from last 7 days   Lab Units 06/15/24  0552 06/14/24  1144   WBC 10*3/mm3 8.22 9.97   HEMOGLOBIN g/dL 12.5 12.7   PLATELETS 10*3/mm3 267 328     Cr Clearance Estimated Creatinine Clearance: 78.5 mL/min (by C-G formula based on SCr of 0.67 mg/dL).  Coag     HbA1C   Lab Results   Component Value Date    HGBA1C 5.70 (H) 10/14/2023     Blood Glucose   Glucose   Date/Time Value Ref Range Status   06/14/2024 1556 170 (H) 70 - 105 mg/dL Final     Comment:     Serial Number: [id number]:  [id number]     Infection     CMP   Results from last 7 days   Lab Units 06/15/24  0552 06/14/24  1144   SODIUM mmol/L 141 139   POTASSIUM mmol/L 4.3 4.1   CHLORIDE mmol/L 107 103   CO2 mmol/L 24.9 22.7   BUN mg/dL 10 18   CREATININE mg/dL 0.67 0.81   GLUCOSE mg/dL 94 109*     ABG      UA      THOM  No results found for: \"POCMETH\", \"POCAMPHET\", \"POCBARBITUR\", \"POCBENZO\", \"POCCOCAINE\", \"POCOPIATES\", \"POCOXYCODO\", \"POCPHENCYC\", \"POCPROPOXY\", \"POCTHC\", \"POCTRICYC\"  Lysis Labs   Results from last 7 days   Lab Units 06/15/24  0552 06/14/24  1144   HEMOGLOBIN g/dL 12.5 12.7   PLATELETS 10*3/mm3 267 328   CREATININE mg/dL 0.67 0.81     Radiology(recent) XR Chest 1 View    Result Date: 6/14/2024  Impression: No acute cardiopulmonary abnormality is identified. Electronically Signed: Ruby Mcarthur  6/14/2024 12:18 PM EDT  Workstation ID: [id number]       Results from last 7 days   Lab Units 06/14/24  1816   HSTROP T ng/L 8       X-rays, labs reviewed personally by physician.    ECG/EMG Results (most recent)       Procedure Component Value Units Date/Time    ECG 12 Lead Syncope [[id number]] Collected: 06/14/24 1151     Updated: 06/14/24 1152     QT Interval 407 ms      QTC Interval 447 ms     Narrative:      HEART " RATE= 71  bpm  RR Interval= 828  ms  OR Interval= 163  ms  P Horizontal Axis= 9  deg  P Front Axis= 31  deg  QRSD Interval= 88  ms  QT Interval= 407  ms  QTcB= 447  ms  QRS Axis= 2  deg  T Wave Axis= 47  deg  - NORMAL ECG -  Sinus rhythm  Electronically Signed By:   Date and Time of Study: 2024-06-14 11:51:40    Telemetry Scan [[id number]] Resulted: 06/14/24     Updated: 06/14/24 1956    Telemetry Scan [[id number]] Resulted: 06/14/24     Updated: 06/15/24 0000    Adult Transthoracic Echo Complete W/ Cont if Necessary Per Protocol [[id number]] Resulted: 06/15/24 1512     Updated: 06/15/24 1513     EF(MOD-bp) 66.2 %      EF_3D-VOL 64.0 %      LV GLOBAL STRAIN  -18.0 %      LVIDd 4.3 cm      LVIDs 2.7 cm      IVSd 0.90 cm      LVPWd 0.90 cm      FS 37.2 %      IVS/LVPW 1.00 cm      ESV(cubed) 19.7 ml      LV Sys Vol (BSA corrected) 49.2 cm2      EDV(cubed) 79.5 ml      LV Baig Vol (BSA corrected) 140.5 cm2      LV mass(C)d 123.3 grams      LVOT area 3.1 cm2      LVOT diam 2.00 cm      EDV(MOD-sp2) 62.4 ml      EDV(MOD-sp4) 65.6 ml      ESV(MOD-sp2) 20.6 ml      ESV(MOD-sp4) 23.0 ml      SV(MOD-sp2) 41.8 ml      SV(MOD-sp4) 42.6 ml      SVi(MOD-SP2) 89.5 ml/m2      SVi(MOD-SP4) 91.2 ml/m2      SVi (LVOT) 207.9 ml/m2      EF(MOD-sp2) 67.0 %      EF(MOD-sp4) 64.9 %      MV E max fabio 88.6 cm/sec      MV A max fabio 102.0 cm/sec      MV dec time 0.21 sec      MV E/A 0.87     LA ESV Index (BP) 113.0 ml/m2      Med Peak E' Fabio 5.8 cm/sec      Lat Peak E' Fabio 8.1 cm/sec      TR max fabio 234.0 cm/sec      Avg E/e' ratio 12.75     SV(LVOT) 97.1 ml      RV Base 3.3 cm      RV Mid 2.6 cm      TAPSE (>1.6) 2.35 cm      RV S' 11.0 cm/sec      LA dimension (2D)  4.4 cm      LV V1 max 154.0 cm/sec      LV V1 max PG 9.5 mmHg      LV V1 mean PG 5.0 mmHg      LV V1 VTI 30.9 cm      Ao pk fabio 149.0 cm/sec      Ao max PG 8.9 mmHg      Ao mean PG 5.0 mmHg      Ao V2 VTI 30.8 cm      CLAUDIA(I,D) 3.2 cm2      MV max PG 6.2 mmHg      MV mean PG 3.0  mmHg      MV V2 VTI 33.4 cm      MV P1/2t 66.6 msec      MVA(P1/2t) 3.3 cm2      MVA(VTI) 2.9 cm2      MV dec slope 423.0 cm/sec2      MR max lc 305.0 cm/sec      MR max PG 37.2 mmHg      TR max PG 21.9 mmHg      RVSP(TR) 24.9 mmHg      RAP systole 3.0 mmHg      RV V1 max PG 2.7 mmHg      RV V1 max 82.2 cm/sec      RV V1 VTI 17.9 cm      PA V2 max 130.0 cm/sec      PA acc time 0.14 sec      Sinus 2.9 cm      STJ 2.5 cm      Dimensionless Index 1.03 (DI)     Narrative:        Estimated right ventricular systolic pressure from tricuspid   regurgitation is normal (<35 mmHg).      Impression:          Telemetry Scan [[id number]] Resulted: 06/14/24     Updated: 06/15/24 1605              Medication Review:   I have reviewed the patient's current medication list  Scheduled Meds:enoxaparin, 40 mg, Subcutaneous, Daily  losartan, 25 mg, Oral, Nightly  pantoprazole, 40 mg, Oral, Q AM  sodium chloride, 10 mL, Intravenous, Q12H      Continuous Infusions:sodium chloride, 100 mL/hr, Last Rate: 100 mL/hr (06/14/24 9052)      PRN Meds:.  senna-docusate sodium **AND** polyethylene glycol **AND** bisacodyl **AND** bisacodyl    Calcium Replacement - Follow Nurse / BPA Driven Protocol    Magnesium Standard Dose Replacement - Follow Nurse / BPA Driven Protocol    nitroglycerin    ondansetron ODT **OR** ondansetron    Phosphorus Replacement - Follow Nurse / BPA Driven Protocol    Potassium Replacement - Follow Nurse / BPA Driven Protocol    [COMPLETED] Insert Peripheral IV **AND** sodium chloride    sodium chloride    sodium chloride    Imaging:  Imaging Results (Last 72 Hours)       Procedure Component Value Units Date/Time    XR Chest 1 View [[id number]] Collected: 06/14/24 1214     Updated: 06/14/24 1220    Narrative:      XR CHEST 1 VW    Date of Exam: 6/14/2024 12:07 PM EDT    Indication: dizziness    Comparison: [facility] chest x-ray 10/14/2023    Findings:  Lungs are adequately expanded and appear clear. Calcified granuloma in the right  "upper lung is stable. No pneumothorax or large pleural effusion is seen. Cardiomediastinal contours appear stable.      Impression:      Impression:  No acute cardiopulmonary abnormality is identified.      Electronically Signed: Ruby Mcarthur    6/14/2024 12:18 PM EDT    Workstation ID: [id number]              NOLAN Bryant  06/15/24  17:18 EDT       EMR Dragon/Transcription:   \"Dictated utilizing Dragon dictation\".       Electronically signed by NOLAN Bryant, 06/15/24, 5:18 PM EDT.  Copied text in this note has been reviewed by me and is accurate as of 06/15/24.    Cardiology attending:  Seen and examined.  Chart and labs reviewed. Independent interpretations of cardiac testing was performed. History and exam findings are verified with above changes noted.  Assessment and plan notated by APC after being formulated by attending consultant.  Note that greater than 50% of the time spent in care of the patient was provided by attending consultant.    Patient reports feeling a little better today.  Echocardiogram reviewed with patient at bedside.  No significant pathology identified.  Normal left ventricular systolic function noted.  Cardiology status is appropriate for discharge to next destination of care when okay with other services.  Mobile cardiac telemetry at discharge.  Follow-up as scheduled next month.    Physical Exam:    General: Alert, cooperative, no distress, appears stated age  Head:  Normocephalic, atraumatic, mucous membranes moist  Eyes:  Conjunctivae/corneas clear, EOM's intact     Neck:  Supple,  no bruit  Lungs:            Clear to auscultation bilaterally, no wheezes rhonchi rales are noted  Chest wall: No tenderness  Heart::  Regular rate and rhythm, S1 and S2 normal, 1/6 holosystolic murmur.  No rub or gallop  Abdomen: Soft, nontender, nondistended bowel sounds active  Extremities: No cyanosis, clubbing, or edema  Pulses: Diminished pedal pulses  Skin:  No rashes or " lesions  Neuro/psych: A&O x3. CN II through XII are grossly intact with appropriate affect

## 2024-06-15 NOTE — PLAN OF CARE
Goal Outcome Evaluation:  Plan of Care Reviewed With: patient        Progress: improving  Outcome Evaluation: Patient alert and oriented. Denies dizziness this shift but complains of some shortness of air. Oxygen levels 88-90 while asleep. 2L oxygen on as needed. Discharge plans pending.

## 2024-06-16 LAB
AORTIC DIMENSIONLESS INDEX: 1.03 (DI)
BH CV ECHO LEFT VENTRICLE GLOBAL LONGITUDINAL STRAIN: -18 %
BH CV ECHO MEAS - AO MAX PG: 8.9 MMHG
BH CV ECHO MEAS - AO MEAN PG: 5 MMHG
BH CV ECHO MEAS - AO V2 MAX: 149 CM/SEC
BH CV ECHO MEAS - AO V2 VTI: 30.8 CM
BH CV ECHO MEAS - AVA(I,D): 3.2 CM2
BH CV ECHO MEAS - EDV(CUBED): 79.5 ML
BH CV ECHO MEAS - EDV(MOD-SP2): 62.4 ML
BH CV ECHO MEAS - EDV(MOD-SP4): 65.6 ML
BH CV ECHO MEAS - EF(MOD-BP): 66.2 %
BH CV ECHO MEAS - EF(MOD-SP2): 67 %
BH CV ECHO MEAS - EF(MOD-SP4): 64.9 %
BH CV ECHO MEAS - EF_3D-VOL: 64 %
BH CV ECHO MEAS - ESV(CUBED): 19.7 ML
BH CV ECHO MEAS - ESV(MOD-SP2): 20.6 ML
BH CV ECHO MEAS - ESV(MOD-SP4): 23 ML
BH CV ECHO MEAS - FS: 37.2 %
BH CV ECHO MEAS - IVS/LVPW: 1 CM
BH CV ECHO MEAS - IVSD: 0.9 CM
BH CV ECHO MEAS - LA DIMENSION: 4.4 CM
BH CV ECHO MEAS - LAT PEAK E' VEL: 8.1 CM/SEC
BH CV ECHO MEAS - LV DIASTOLIC VOL/BSA (35-75): 140.5 CM2
BH CV ECHO MEAS - LV MASS(C)D: 123.3 GRAMS
BH CV ECHO MEAS - LV MAX PG: 9.5 MMHG
BH CV ECHO MEAS - LV MEAN PG: 5 MMHG
BH CV ECHO MEAS - LV SYSTOLIC VOL/BSA (12-30): 49.2 CM2
BH CV ECHO MEAS - LV V1 MAX: 154 CM/SEC
BH CV ECHO MEAS - LV V1 VTI: 30.9 CM
BH CV ECHO MEAS - LVIDD: 4.3 CM
BH CV ECHO MEAS - LVIDS: 2.7 CM
BH CV ECHO MEAS - LVOT AREA: 3.1 CM2
BH CV ECHO MEAS - LVOT DIAM: 2 CM
BH CV ECHO MEAS - LVPWD: 0.9 CM
BH CV ECHO MEAS - MED PEAK E' VEL: 5.8 CM/SEC
BH CV ECHO MEAS - MR MAX PG: 37.2 MMHG
BH CV ECHO MEAS - MR MAX VEL: 305 CM/SEC
BH CV ECHO MEAS - MV A MAX VEL: 102 CM/SEC
BH CV ECHO MEAS - MV DEC SLOPE: 423 CM/SEC2
BH CV ECHO MEAS - MV DEC TIME: 0.21 SEC
BH CV ECHO MEAS - MV E MAX VEL: 88.6 CM/SEC
BH CV ECHO MEAS - MV E/A: 0.87
BH CV ECHO MEAS - MV MAX PG: 6.2 MMHG
BH CV ECHO MEAS - MV MEAN PG: 3 MMHG
BH CV ECHO MEAS - MV P1/2T: 66.6 MSEC
BH CV ECHO MEAS - MV V2 VTI: 33.4 CM
BH CV ECHO MEAS - MVA(P1/2T): 3.3 CM2
BH CV ECHO MEAS - MVA(VTI): 2.9 CM2
BH CV ECHO MEAS - PA ACC TIME: 0.14 SEC
BH CV ECHO MEAS - PA V2 MAX: 130 CM/SEC
BH CV ECHO MEAS - RAP SYSTOLE: 3 MMHG
BH CV ECHO MEAS - RV MAX PG: 2.7 MMHG
BH CV ECHO MEAS - RV V1 MAX: 82.2 CM/SEC
BH CV ECHO MEAS - RV V1 VTI: 17.9 CM
BH CV ECHO MEAS - RVSP: 24.9 MMHG
BH CV ECHO MEAS - SV(LVOT): 97.1 ML
BH CV ECHO MEAS - SV(MOD-SP2): 41.8 ML
BH CV ECHO MEAS - SV(MOD-SP4): 42.6 ML
BH CV ECHO MEAS - SVI(LVOT): 207.9 ML/M2
BH CV ECHO MEAS - SVI(MOD-SP2): 89.5 ML/M2
BH CV ECHO MEAS - SVI(MOD-SP4): 91.2 ML/M2
BH CV ECHO MEAS - TAPSE (>1.6): 2.35 CM
BH CV ECHO MEAS - TR MAX PG: 21.9 MMHG
BH CV ECHO MEAS - TR MAX VEL: 234 CM/SEC
BH CV ECHO MEASUREMENTS AVERAGE E/E' RATIO: 12.75
BH CV XLRA - RV BASE: 3.3 CM
BH CV XLRA - RV MID: 2.6 CM
BH CV XLRA - TDI S': 11 CM/SEC
LEFT ATRIUM VOLUME INDEX: 113 ML/M2
SINUS: 2.9 CM
STJ: 2.5 CM

## 2024-06-17 LAB
QT INTERVAL: 407 MS
QTC INTERVAL: 447 MS

## 2024-06-17 NOTE — CASE MANAGEMENT/SOCIAL WORK
Case Management Discharge Note      Final Note: Home    Provided Post Acute Provider List?: N/A            Transportation Services  Private: Car    Final Discharge Disposition Code: 01 - home or self-care

## 2024-09-24 ENCOUNTER — OFFICE VISIT (OUTPATIENT)
Dept: CARDIOLOGY | Facility: CLINIC | Age: 74
End: 2024-09-24
Payer: MEDICARE

## 2024-09-24 VITALS
OXYGEN SATURATION: 99 % | WEIGHT: 177 LBS | BODY MASS INDEX: 28.45 KG/M2 | DIASTOLIC BLOOD PRESSURE: 52 MMHG | HEART RATE: 87 BPM | HEIGHT: 66 IN | SYSTOLIC BLOOD PRESSURE: 158 MMHG

## 2024-09-24 DIAGNOSIS — E78.2 MIXED HYPERLIPIDEMIA: ICD-10-CM

## 2024-09-24 DIAGNOSIS — I77.819 AORTIC ECTASIA: Primary | ICD-10-CM

## 2024-09-24 DIAGNOSIS — I10 PRIMARY HYPERTENSION: ICD-10-CM

## 2024-09-24 DIAGNOSIS — Z79.02 LONG TERM CURRENT USE OF ANTITHROMBOTICS/ANTIPLATELETS: ICD-10-CM

## 2024-09-24 PROCEDURE — 99214 OFFICE O/P EST MOD 30 MIN: CPT | Performed by: INTERNAL MEDICINE

## 2024-09-24 PROCEDURE — 3077F SYST BP >= 140 MM HG: CPT | Performed by: INTERNAL MEDICINE

## 2024-09-24 PROCEDURE — 3078F DIAST BP <80 MM HG: CPT | Performed by: INTERNAL MEDICINE

## 2024-09-24 PROCEDURE — 1160F RVW MEDS BY RX/DR IN RCRD: CPT | Performed by: INTERNAL MEDICINE

## 2024-09-24 PROCEDURE — G2211 COMPLEX E/M VISIT ADD ON: HCPCS | Performed by: INTERNAL MEDICINE

## 2024-09-24 PROCEDURE — 1159F MED LIST DOCD IN RCRD: CPT | Performed by: INTERNAL MEDICINE

## 2025-03-25 ENCOUNTER — OFFICE VISIT (OUTPATIENT)
Dept: CARDIOLOGY | Facility: CLINIC | Age: 75
End: 2025-03-25
Payer: MEDICARE

## 2025-03-25 VITALS
DIASTOLIC BLOOD PRESSURE: 60 MMHG | WEIGHT: 177 LBS | OXYGEN SATURATION: 98 % | SYSTOLIC BLOOD PRESSURE: 126 MMHG | HEART RATE: 81 BPM | HEIGHT: 66 IN | BODY MASS INDEX: 28.45 KG/M2

## 2025-03-25 DIAGNOSIS — Z79.02 LONG TERM CURRENT USE OF ANTITHROMBOTICS/ANTIPLATELETS: ICD-10-CM

## 2025-03-25 DIAGNOSIS — E78.2 MIXED HYPERLIPIDEMIA: Primary | ICD-10-CM

## 2025-03-25 DIAGNOSIS — I10 PRIMARY HYPERTENSION: ICD-10-CM

## 2025-03-25 PROCEDURE — 99214 OFFICE O/P EST MOD 30 MIN: CPT | Performed by: INTERNAL MEDICINE

## 2025-03-25 PROCEDURE — 3078F DIAST BP <80 MM HG: CPT | Performed by: INTERNAL MEDICINE

## 2025-03-25 PROCEDURE — 3074F SYST BP LT 130 MM HG: CPT | Performed by: INTERNAL MEDICINE

## 2025-03-25 PROCEDURE — 1160F RVW MEDS BY RX/DR IN RCRD: CPT | Performed by: INTERNAL MEDICINE

## 2025-03-25 PROCEDURE — 1159F MED LIST DOCD IN RCRD: CPT | Performed by: INTERNAL MEDICINE

## 2025-03-25 PROCEDURE — G2211 COMPLEX E/M VISIT ADD ON: HCPCS | Performed by: INTERNAL MEDICINE

## 2025-03-25 NOTE — PROGRESS NOTES
Cardiology Office Visit      Encounter Date:  2025    PATIENT IDENTIFICATION    Name: Tori Brantley  Age: 75 y.o. Sex: female : 1950  MRN: [id number]    Reason For Followup:  Aortic ectasia  Hypertension  Hyperlipidemia  Syncope    Brief Clinical History:  Dear Nicholas Marion MD    I had the pleasure of seeing Tori Brantley today. As you are well aware, this is a 75 y.o. female with no established history of ischemic heart disease.  She does have a history of hypertension, hyperlipidemia, aortic ectasia, and TIA.  She presents today for follow-up on the above conditions.    Interval History:  2021                                           She denies any chest pain pressure heaviness or tightness.  She denies any shortness of breath.  She denies any PND orthopnea.  She denies any syncope or near syncope. She is reporting some occasional palpitations. She otherwise reports feeling well from a cardiac perspective.     We did review her point-of-care lipid profile today.  Her results have deteriorated.  She reports that she has been sleeping in her diet.  She will work on this.     She had a CT angiography of her chest performed in 2020.  She has a reported history of aortic ectasia however the CT scan failed to demonstrate any significant enlargement of the aorta.  In fact no evidence of ectasia or aneurysm was noted.  We reviewed these results today.    2024        She reports feeling well. No new issues. She only wore monitor for 8 days because she had blistering the skin. No chest pain. No further syncope. She has not taken her blood pressure medications for 3 months. Her blood pressure at home was 130/78. She thinks the syncope may have been from low blood pressure because adjustments were made just before the episode.     2025        Sometimes she will have some palpitations but otherwise she feels good. No chest pain.  No shortness of breath.  No other  "issues.      Assessment & Plan     Impressions:  Hypertension  Hyperlipidemia  History of aortic ectasia     CTA October 2020 with no evidence of ectasia or aneurysm  History of TIA     Recommendations:  Continuation of her current cardiovascular regimen at the present time.     This includes Zetia, antihypertensives, and antiplatelets.  Routine surveillance laboratory testing  Continue with diet and exercise  Follow-up in 9 months time sooner should there be difficulties.    Diagnoses and all orders for this visit:    1. Mixed hyperlipidemia (Primary)    2. Primary hypertension    3. Long term current use of antithrombotics/antiplatelets            Objective:    Vitals:  Vitals:    03/25/25 1329   BP: 126/60   BP Location: Left arm   Patient Position: Sitting   Cuff Size: Adult   Pulse: 81   SpO2: 98%   Weight: 80.3 kg (177 lb)   Height: 167.6 cm (66\")     Body mass index is 28.57 kg/m².      Physical Exam:    General: Alert, cooperative, no distress, appears stated age  Head:  Normocephalic, atraumatic, mucous membranes moist  Eyes:  Conjunctiva/corneas clear, EOM's intact     Neck:  Supple,  no bruit    Lungs: Clear to auscultation bilaterally, no wheezes rhonchi rales are noted  Chest wall: No tenderness  Heart::  Regular rate and rhythm, S1 and S2 normal, 1/6 holosystolic murmur.  No rub or gallop  Abdomen: Soft, non-tender, nondistended bowel sounds active  Extremities: No cyanosis, clubbing, or edema  Pulses: 2+ and symmetric all extremities  Skin:  No rashes or lesions  Neuro/psych: A&O x3. CN II through XII are grossly intact with appropriate affect      Allergies:  Allergies   Allergen Reactions    Ibuprofen Hives    Terbinafine Itching    Dilantin [Phenytoin] Swelling    Latex Hives    Nickel Dermatitis, Itching and Swelling    Propofol Unknown - Low Severity    Bactrim [Sulfamethoxazole-Trimethoprim] Rash    Codeine Rash    Doxycycline Rash    Erythromycin Rash    Iodine Rash    Misc. Sulfonamide " Containing Compounds Rash    Norco [Hydrocodone-Acetaminophen] Rash    Zocor [Simvastatin] Rash       Medication Review:     Current Outpatient Medications:     acetaminophen (TYLENOL) 500 MG tablet, Take 1 tablet by mouth Every 6 (Six) Hours As Needed for Mild Pain., Disp: , Rfl:     albuterol sulfate  (90 Base) MCG/ACT inhaler, Inhale 1 puff Every 6 (Six) Hours As Needed., Disp: , Rfl:     aspirin (Aspir-Low) 81 MG EC tablet, Take 1 tablet by mouth Every Evening., Disp: , Rfl:     biotin in ora blend sugar free, Take 800 mL by mouth Daily., Disp: , Rfl:     coenzyme Q10 100 MG capsule, Take 1 capsule by mouth Daily., Disp: , Rfl:     levocetirizine (Xyzal Allergy 24HR) 5 MG tablet, Take 1 tablet by mouth Every Evening., Disp: , Rfl:     omeprazole OTC (PrilOSEC OTC) 20 MG EC tablet, Take 1 tablet by mouth Daily., Disp: , Rfl:     saccharomyces boulardii (FLORASTOR) 250 MG capsule, Take 1 capsule by mouth Daily., Disp: , Rfl:     Family History:  Family History   Problem Relation Age of Onset    Heart disease Mother     Hypertension Mother     Heart failure Father     Hyperlipidemia Father     Hypertension Father     Hypertension Brother        Past Medical History:  Past Medical History:   Diagnosis Date    Abnormal ECG 6/6/24    ER visit    Hyperlipidemia     Hypertension     Stroke        Past Surgical History:  Past Surgical History:   Procedure Laterality Date    CATARACT EXTRACTION, BILATERAL      EYE SURGERY      HYSTERECTOMY      LAPAROSCOPIC CHOLECYSTECTOMY         Social History:  Social History     Socioeconomic History    Marital status:    Tobacco Use    Smoking status: Never     Passive exposure: Never    Smokeless tobacco: Never    Tobacco comments:     Allergic to the plant   Vaping Use    Vaping status: Never Used   Substance and Sexual Activity    Alcohol use: Never    Drug use: Never    Sexual activity: Yes     Partners: Male     Birth control/protection: Post-menopausal        Review of Systems:  The following systems were reviewed as they relate to the cardiovascular system: Constitutional, Eyes, ENT, Cardiovascular, Respiratory, Gastrointestinal, Integumentary, Neurological, Psychiatric, Hematologic, Endocrine, Musculoskeletal, and Genitourinary. The pertinent cardiovascular findings are reported above with all other cardiovascular points within those systems being negative.    Diagnostic Study Review:     Current Electrocardiogram:  Procedures normal sinus rhythm with a ventricular rate of 81 bpm.  Left ventricular hypertrophy.  Normal QT and QTc intervals.    Laboratory Data:  Lab Results   Component Value Date    GLUCOSE 94 06/15/2024    BUN 10 06/15/2024    CREATININE 0.67 06/15/2024    EGFRIFNONA 79 09/22/2020    BCR 14.9 06/15/2024    K 4.3 06/15/2024    CO2 24.9 06/15/2024    CALCIUM 9.6 06/15/2024    ALBUMIN 4.3 06/06/2024    AST 37 (H) 06/06/2024    ALT 27 06/06/2024     Lab Results   Component Value Date    GLUCOSE 94 06/15/2024    CALCIUM 9.6 06/15/2024     06/15/2024    K 4.3 06/15/2024    CO2 24.9 06/15/2024     06/15/2024    BUN 10 06/15/2024    CREATININE 0.67 06/15/2024    EGFRIFNONA 79 09/22/2020    BCR 14.9 06/15/2024    ANIONGAP 9.1 06/15/2024     Lab Results   Component Value Date    WBC 8.22 06/15/2024    HGB 12.5 06/15/2024    HCT 40.4 06/15/2024    MCV 98.1 (H) 06/15/2024     06/15/2024     Lab Results   Component Value Date    CHOL 214 (H) 10/14/2023    TRIG 159 (H) 10/14/2023    HDL 78 (H) 10/14/2023     (H) 10/14/2023     Lab Results   Component Value Date    HGBA1C 5.70 (H) 10/14/2023     Lab Results   Component Value Date    INR 0.9 04/24/2019    PROTIME 9.6 04/24/2019       Most Recent Echo:  Results for orders placed during the hospital encounter of 06/14/24    Adult Transthoracic Echo Complete W/ Cont if Necessary Per Protocol    Interpretation Summary    Left ventricular systolic function is normal. Left ventricular  "ejection fraction appears to be 61 - 65%.    Left ventricular diastolic function is consistent with (grade I) impaired relaxation.    Estimated right ventricular systolic pressure from tricuspid regurgitation is normal (<35 mmHg).       Most Recent Stress Test:  Results for orders placed during the hospital encounter of 10/14/23    Stress Test With Myocardial Perfusion One Day    Interpretation Summary  NORMAL EXERCISE SPECT MPI    1. Negative Exercise Electrocardiography: There is no ECG evidence of myocardial ischemia at or near the maximal predicted heart rate.  2. Physiologic blood-pressure response to exercise.  3. No exercise-induced arrhythmias.  4. Normal SPECT Myocardial Perfusion Imaging: There is no scintigraphic evidence of myocardial ischemia or scarring.  5. Overall left ventricular function is preserved, with a normal ejection fraction and no regional asynergy.  6. Normal functional capacity for age and gender.       Most Recent Cardiac Catheterization:   No results found for this or any previous visit.       NOTE: The following portions of the patient's note were reviewed, confirmed and/or updated this visit as appropriate: History of present illness/Interval history, physical examination, assessment & plan, allergies, current medications, past family history, past medical history, past social history, past surgical history and problem list.    Labs pertinent to today's visit on 03/25/2025 (including but not limited to CBC, CMP, and lipid profiles) were requested from the patient's primary care provider/hospital/clinical laboratory.  If the labs were available for the visit, they were reviewed with the patient.  If they were not available, when received, special interest will be made to the labs pertinent to this visit.  The patient's most recent \"in-house\" labs are noted below and have been reviewed.  Outside labs pertinent to this visit are scanned into the record and have been " reviewed.    Discussions held today, 03/25/2025,regarding procedures included risk, benefits, and options including but not limited to: Death, MI, stroke, pain, bleeding, infection, and possible need for vascular/thoracic/cardiothoracic surgery.    Copied information within this note was reviewed and is current as of 03/25/2025.    Assessment and plan noted herein represents the current plan of care as of 03/25/2025.    Significant resources from our office and staff are inherent in engaging this patient in a continuous and active collaborative plan of care related to their chronic cardiovascular conditions outlined herein.  The management of these conditions requires the direction of our service with specialized clinical knowledge, skills, and experience.  This collaborative care includes but is not limited to patient education, expectations and responsibilities, shared decision making around therapeutic goals, and shared commitments to achieve those goals.